# Patient Record
Sex: FEMALE | Race: WHITE | Employment: FULL TIME | URBAN - METROPOLITAN AREA
[De-identification: names, ages, dates, MRNs, and addresses within clinical notes are randomized per-mention and may not be internally consistent; named-entity substitution may affect disease eponyms.]

---

## 2022-01-14 ENCOUNTER — APPOINTMENT (OUTPATIENT)
Dept: CT IMAGING | Age: 57
End: 2022-01-14
Attending: EMERGENCY MEDICINE
Payer: COMMERCIAL

## 2022-01-14 ENCOUNTER — HOSPITAL ENCOUNTER (EMERGENCY)
Age: 57
Discharge: HOME | End: 2022-01-14
Attending: EMERGENCY MEDICINE
Payer: COMMERCIAL

## 2022-01-14 VITALS
HEIGHT: 68 IN | HEART RATE: 104 BPM | RESPIRATION RATE: 18 BRPM | OXYGEN SATURATION: 100 % | TEMPERATURE: 99.1 F | DIASTOLIC BLOOD PRESSURE: 101 MMHG | SYSTOLIC BLOOD PRESSURE: 157 MMHG | BODY MASS INDEX: 34.41 KG/M2 | WEIGHT: 227.07 LBS

## 2022-01-14 DIAGNOSIS — N12 PYELONEPHRITIS: Primary | ICD-10-CM

## 2022-01-14 LAB
APPEARANCE UR: CLEAR
BACTERIA URNS QL MICRO: NEGATIVE /HPF
BILIRUB UR QL: NEGATIVE
CAOX CRY URNS QL MICRO: ABNORMAL
COLOR UR: ABNORMAL
EPITH CASTS URNS QL MICRO: ABNORMAL /LPF
GLUCOSE UR STRIP.AUTO-MCNC: NEGATIVE MG/DL
HGB UR QL STRIP: NEGATIVE
KETONES UR QL STRIP.AUTO: NEGATIVE MG/DL
LEUKOCYTE ESTERASE UR QL STRIP.AUTO: ABNORMAL
NITRITE UR QL STRIP.AUTO: NEGATIVE
PH UR STRIP: 5.5 [PH] (ref 5–8)
PROT UR STRIP-MCNC: NEGATIVE MG/DL
RBC #/AREA URNS HPF: ABNORMAL /HPF (ref 0–5)
SP GR UR REFRACTOMETRY: 1.02 (ref 1–1.03)
UA: UC IF INDICATED,UAUC: ABNORMAL
UROBILINOGEN UR QL STRIP.AUTO: 0.2 EU/DL (ref 0.2–1)
WBC URNS QL MICRO: ABNORMAL /HPF (ref 0–4)

## 2022-01-14 PROCEDURE — 74011250636 HC RX REV CODE- 250/636: Performed by: EMERGENCY MEDICINE

## 2022-01-14 PROCEDURE — 81001 URINALYSIS AUTO W/SCOPE: CPT

## 2022-01-14 PROCEDURE — 74176 CT ABD & PELVIS W/O CONTRAST: CPT

## 2022-01-14 PROCEDURE — 99282 EMERGENCY DEPT VISIT SF MDM: CPT

## 2022-01-14 PROCEDURE — 96372 THER/PROPH/DIAG INJ SC/IM: CPT

## 2022-01-14 PROCEDURE — U0005 INFEC AGEN DETEC AMPLI PROBE: HCPCS

## 2022-01-14 RX ORDER — NAPROXEN 500 MG/1
500 TABLET ORAL 2 TIMES DAILY WITH MEALS
Qty: 20 TABLET | Refills: 0 | Status: SHIPPED | OUTPATIENT
Start: 2022-01-14 | End: 2022-01-24

## 2022-01-14 RX ORDER — KETOROLAC TROMETHAMINE 30 MG/ML
30 INJECTION, SOLUTION INTRAMUSCULAR; INTRAVENOUS
Status: COMPLETED | OUTPATIENT
Start: 2022-01-14 | End: 2022-01-14

## 2022-01-14 RX ORDER — NAPROXEN 500 MG/1
500 TABLET ORAL 2 TIMES DAILY WITH MEALS
Qty: 20 TABLET | Refills: 0 | Status: SHIPPED | OUTPATIENT
Start: 2022-01-14 | End: 2022-01-14 | Stop reason: SDUPTHER

## 2022-01-14 RX ORDER — CEPHALEXIN 500 MG/1
500 CAPSULE ORAL 4 TIMES DAILY
Qty: 28 CAPSULE | Refills: 0 | Status: SHIPPED | OUTPATIENT
Start: 2022-01-14 | End: 2022-01-21

## 2022-01-14 RX ORDER — CEPHALEXIN 500 MG/1
500 CAPSULE ORAL 4 TIMES DAILY
Qty: 28 CAPSULE | Refills: 0 | Status: SHIPPED | OUTPATIENT
Start: 2022-01-14 | End: 2022-01-14 | Stop reason: SDUPTHER

## 2022-01-14 RX ADMIN — KETOROLAC TROMETHAMINE 30 MG: 30 INJECTION, SOLUTION INTRAMUSCULAR; INTRAVENOUS at 16:07

## 2022-01-14 NOTE — ED PROVIDER NOTES
EMERGENCY DEPARTMENT HISTORY AND PHYSICAL EXAM      Date: 1/14/2022  Patient Name: Ryann Bey  Patient Age and Sex: 55 y.o. female     History of Presenting Illness     Chief Complaint   Patient presents with    Flank Pain     x 1 week with hematuria    Headache     tuesday started with headache and fever. History Provided By: Patient    HPI: Ryann Bey is a 76-year-old female presenting with flank pain and fevers. Patient states that over the past couple days has been having some dysuria and hematuria. Thought she might of passed a stone however now continues to have right-sided flank pain radiating to her groin. Also having headache and fevers. No known exposures and she is unvaccinated but feeling unwell. There are no other complaints, changes, or physical findings at this time. PCP: Gypsy Casarez MD    No current facility-administered medications on file prior to encounter. Current Outpatient Medications on File Prior to Encounter   Medication Sig Dispense Refill    omeprazole (PRILOSEC) 40 mg capsule TAKE ONE CAPSULE BY MOUTH DAILY 30 Cap 0    butalbital-aspirin-caffeine (FIORINAL) -40 mg tablet Take 1 Tab by mouth every six (6) hours as needed for Pain or Headache. 30 Tab 3    esomeprazole (NEXIUM) 40 mg capsule Take 1 Cap by mouth daily. 42 Cap 0    alum-mag hydroxide-simeth (MAALOX) 200-200-20 mg/5 mL Susp Take 30 mL by mouth every four (4) hours as needed. 780 mL 0    levothyroxine (SYNTHROID) 50 mcg tablet Take one every three days. 30 Tab 3    mometasone-formoterol (DULERA) 100-5 mcg/actuation HFAA HFA inhaler Take 2 Puffs by inhalation two (2) times a day. 1 Inhaler 0    chlorpheniramine-HYDROcodone (TUSSIONEX) 8-10 mg/5 mL suspension Take 5 mL by mouth every twelve (12) hours as needed for Cough. 90 mL 0    lisinopril (PRINIVIL, ZESTRIL) 10 mg tablet Take 1 Tab by mouth daily. 90 Tab 3    PARoxetine (PAXIL) 20 mg tablet Take 1 Tab by mouth daily.  27 Tab 6    famotidine (PEPCID) 20 mg tablet Take 1 Tab by mouth two (2) times a day. 20 Tab 0    meclizine 50 mg tablet Take 50 mg by mouth three (3) times daily as needed. 80 Tab 3       Past History     Past Medical History:  Past Medical History:   Diagnosis Date    Asthma     Diabetes ([address]     GERD (gastroesophageal reflux disease)     Hypertension     Other ill-defined conditions     meinere's disease       Past Surgical History:  Past Surgical History:   Procedure Laterality Date    HX TUBAL LIGATION  1993       Family History:  Family History   Problem Relation Age of Onset    Diabetes Mother     Hypertension Mother     Heart Disease Father        Social History:  Social History     Tobacco Use    Smoking status: Never Smoker    Smokeless tobacco: Never Used   Substance Use Topics    Alcohol use: No    Drug use: No       Allergies: Allergies   Allergen Reactions    Erythromycin Base Nausea and Vomiting    Tylenol-Codeine #3 [Acetaminophen-Codeine] Itching     Reports can take Tylenol Just codeine causes itching         Review of Systems   Review of Systems   Constitutional: Positive for fatigue and fever. Negative for chills. Respiratory: Negative for cough and shortness of breath. Cardiovascular: Negative for chest pain. Gastrointestinal: Negative for abdominal pain, constipation, diarrhea, nausea and vomiting. Genitourinary: Positive for dysuria, flank pain and hematuria. Negative for frequency. Neurological: Positive for headaches. Negative for weakness and numbness. All other systems reviewed and are negative. Physical Exam   Physical Exam  Vitals and nursing note reviewed. Constitutional:       Appearance: She is well-developed. HENT:      Head: Normocephalic and atraumatic. Nose: Nose normal.      Mouth/Throat:      Mouth: Mucous membranes are moist.   Eyes:      Extraocular Movements: Extraocular movements intact.       Conjunctiva/sclera: Conjunctivae normal.   Cardiovascular:      Rate and Rhythm: Normal rate and regular rhythm. Pulmonary:      Effort: Pulmonary effort is normal. No respiratory distress. Breath sounds: Normal breath sounds. Abdominal:      General: There is no distension. Palpations: Abdomen is soft. Tenderness: There is no abdominal tenderness. Musculoskeletal:         General: Normal range of motion. Cervical back: Normal range of motion and neck supple. Comments: Right flank tenderness   Skin:     General: Skin is warm and dry. Neurological:      General: No focal deficit present. Mental Status: She is alert and oriented to person, place, and time. Mental status is at baseline. Psychiatric:      Comments: Patient is anxious and tearful          Diagnostic Study Results     Labs -     Recent Results (from the past 12 hour(s))   URINALYSIS W/ REFLEX CULTURE    Collection Time: 01/14/22  3:37 PM    Specimen: Urine   Result Value Ref Range    Color YELLOW/STRAW      Appearance CLEAR CLEAR      Specific gravity 1.023 1.003 - 1.030      pH (UA) 5.5 5.0 - 8.0      Protein Negative NEG mg/dL    Glucose Negative NEG mg/dL    Ketone Negative NEG mg/dL    Bilirubin Negative NEG      Blood Negative NEG      Urobilinogen 0.2 0.2 - 1.0 EU/dL    Nitrites Negative NEG      Leukocyte Esterase MODERATE (A) NEG      WBC 5-10 0 - 4 /hpf    RBC 0-5 0 - 5 /hpf    Epithelial cells FEW FEW /lpf    Bacteria Negative NEG /hpf    UA:UC IF INDICATED CULTURE NOT INDICATED BY UA RESULT CNI      CA Oxalate crystals FEW (A) NEG         Radiologic Studies -   CT ABD PELV WO CONT   Final Result   No renal, ureteral, or bladder calculus. CT Results  (Last 48 hours)               01/14/22 1556  CT ABD PELV WO CONT Final result    Impression:  No renal, ureteral, or bladder calculus. Narrative:  EXAM: CT ABD PELV WO CONT       INDICATION: right flank pain with dysuria       COMPARISON: None       CONTRAST:  None. TECHNIQUE:    Thin axial images were obtained through the abdomen and pelvis. Coronal and   sagittal reformats were generated. Oral contrast was not administered. CT dose   reduction was achieved through use of a standardized protocol tailored for this   examination and automatic exposure control for dose modulation. The absence of intravenous contrast material reduces the sensitivity for   evaluation of the vasculature and solid organs. FINDINGS:    LOWER THORAX: No significant abnormality in the incidentally imaged lower chest.   LIVER: No mass. BILIARY TREE: Prior cholecystectomy. CBD is not dilated. SPLEEN: within normal limits. PANCREAS: No focal abnormality. ADRENALS: Unremarkable. KIDNEYS/URETERS: No calculus or hydronephrosis. STOMACH: Unremarkable. SMALL BOWEL: No dilatation or wall thickening. COLON: No dilatation or wall thickening. Sigmoid diverticulosis. APPENDIX: Normal appendix on axial image 61. PERITONEUM: No ascites or pneumoperitoneum. RETROPERITONEUM: No lymphadenopathy or aortic aneurysm. REPRODUCTIVE ORGANS: Anteverted uterus. URINARY BLADDER: No mass or calculus. BONES: No destructive bone lesion. ABDOMINAL WALL: No mass or hernia. ADDITIONAL COMMENTS: N/A               CXR Results  (Last 48 hours)    None            Medical Decision Making   I am the first provider for this patient. I reviewed the vital signs, available nursing notes, past medical history, past surgical history, family history and social history. Vital Signs-Reviewed the patient's vital signs. Patient Vitals for the past 12 hrs:   Temp Pulse Resp BP SpO2   01/14/22 1528 99.1 °F (37.3 °C) (!) 104 18 (!) 157/101 100 %       Records Reviewed: Nursing Notes and Old Medical Records    Provider Notes (Medical Decision Making):   Patient presenting with right-sided flank pain with hematuria and dysuria as well as low-grade fever.   Differential includes pyelonephritis, infected stone, COVID-19 infection. ED Course:   Initial assessment performed. The patients presenting problems have been discussed, and they are in agreement with the care plan formulated and outlined with them. I have encouraged them to ask questions as they arise throughout their visit. Critical Care Time:   0    Disposition:  Discharge Note:  The patient has been re-evaluated and is ready for discharge. Reviewed available results with patient. Counseled patient on diagnosis and care plan. Patient has expressed understanding, and all questions have been answered. Patient agrees with plan and agrees to follow up as recommended, or to return to the ED if their symptoms worsen. Discharge instructions have been provided and explained to the patient, along with reasons to return to the ED. PLAN:  Discharge Medication List as of 1/14/2022  4:52 PM      START taking these medications    Details   cephALEXin (Keflex) 500 mg capsule Take 1 Capsule by mouth four (4) times daily for 7 days. , Normal, Disp-28 Capsule, R-0      naproxen (Naprosyn) 500 mg tablet Take 1 Tablet by mouth two (2) times daily (with meals) for 10 days. , Normal, Disp-20 Tablet, R-0         CONTINUE these medications which have NOT CHANGED    Details   omeprazole (PRILOSEC) 40 mg capsule TAKE ONE CAPSULE BY MOUTH DAILY, Normal, Disp-30 Cap, R-0      butalbital-aspirin-caffeine (FIORINAL) -40 mg tablet Take 1 Tab by mouth every six (6) hours as needed for Pain or Headache., Print, Disp-30 Tab, R-3      esomeprazole (NEXIUM) 40 mg capsule Take 1 Cap by mouth daily. , Print, Disp-42 Cap, R-0      alum-mag hydroxide-simeth (MAALOX) 200-200-20 mg/5 mL Susp Take 30 mL by mouth every four (4) hours as needed. , Print, Disp-780 mL, R-0      levothyroxine (SYNTHROID) 50 mcg tablet Take one every three days. , Sample, Disp-30 Tab, R-3      mometasone-formoterol (DULERA) 100-5 mcg/actuation HFAA HFA inhaler Take 2 Puffs by inhalation two (2) times a day., Sample, Disp-1 Inhaler, R-0      chlorpheniramine-HYDROcodone (TUSSIONEX) 8-10 mg/5 mL suspension Take 5 mL by mouth every twelve (12) hours as needed for Cough. , Print, Disp-90 mL, R-0      lisinopril (PRINIVIL, ZESTRIL) 10 mg tablet Take 1 Tab by mouth daily. Print, 10 mg, Disp-90 Tab, R-3      PARoxetine (PAXIL) 20 mg tablet Take 1 Tab by mouth daily. Print, 20 mg, Disp-30 Tab, R-6      famotidine (PEPCID) 20 mg tablet Take 1 Tab by mouth two (2) times a day. Print, 20 mg, Disp-20 Tab, R-0      meclizine 50 mg tablet Take 50 mg by mouth three (3) times daily as needed. Normal, 50 mg, Disp-90 Tab, R-3           2. Follow-up Information     Follow up With Specialties Details Why Contact Info    Liz Cabrales MD Family Medicine Schedule an appointment as soon as possible for a visit   [address] [place] [address] [phone]          3. Return to ED if worse     Diagnosis     Clinical Impression:   1. Pyelonephritis        Attestations:    Divya Saldivar M.D. Please note that this dictation was completed with [employer], the computer voice recognition software. Quite often unanticipated grammatical, syntax, homophones, and other interpretive errors are inadvertently transcribed by the computer software. Please disregard these errors. Please excuse any errors that have escaped final proofreading. Thank you.

## 2022-01-16 LAB
SARS-COV-2, XPLCVT: DETECTED
SOURCE, COVRS: ABNORMAL

## 2022-09-20 ENCOUNTER — APPOINTMENT (OUTPATIENT)
Dept: GENERAL RADIOLOGY | Age: 57
End: 2022-09-20
Attending: EMERGENCY MEDICINE
Payer: COMMERCIAL

## 2022-09-20 ENCOUNTER — HOSPITAL ENCOUNTER (EMERGENCY)
Age: 57
Discharge: HOME | End: 2022-09-20
Attending: EMERGENCY MEDICINE
Payer: COMMERCIAL

## 2022-09-20 VITALS
SYSTOLIC BLOOD PRESSURE: 132 MMHG | DIASTOLIC BLOOD PRESSURE: 85 MMHG | HEIGHT: 68 IN | BODY MASS INDEX: 34.71 KG/M2 | OXYGEN SATURATION: 95 % | HEART RATE: 60 BPM | WEIGHT: 229 LBS | TEMPERATURE: 97.9 F | RESPIRATION RATE: 13 BRPM

## 2022-09-20 DIAGNOSIS — R11.2 NAUSEA AND VOMITING, UNSPECIFIED VOMITING TYPE: ICD-10-CM

## 2022-09-20 DIAGNOSIS — R51.9 NONINTRACTABLE HEADACHE, UNSPECIFIED CHRONICITY PATTERN, UNSPECIFIED HEADACHE TYPE: Primary | ICD-10-CM

## 2022-09-20 DIAGNOSIS — J06.9 UPPER RESPIRATORY TRACT INFECTION, UNSPECIFIED TYPE: ICD-10-CM

## 2022-09-20 LAB
ALBUMIN SERPL-MCNC: 3.6 G/DL (ref 3.5–5)
ALBUMIN/GLOB SERPL: 0.8 {RATIO} (ref 1.1–2.2)
ALP SERPL-CCNC: 107 U/L (ref 45–117)
ALT SERPL-CCNC: 23 U/L (ref 12–78)
ANION GAP SERPL CALC-SCNC: 11 MMOL/L (ref 5–15)
AST SERPL-CCNC: 13 U/L (ref 15–37)
BASOPHILS # BLD: 0 K/UL (ref 0–0.1)
BASOPHILS NFR BLD: 0 % (ref 0–1)
BILIRUB SERPL-MCNC: 0.3 MG/DL (ref 0.2–1)
BUN SERPL-MCNC: 12 MG/DL (ref 6–20)
BUN/CREAT SERPL: 13 (ref 12–20)
CALCIUM SERPL-MCNC: 8.9 MG/DL (ref 8.5–10.1)
CHLORIDE SERPL-SCNC: 104 MMOL/L (ref 97–108)
CO2 SERPL-SCNC: 25 MMOL/L (ref 21–32)
CREAT SERPL-MCNC: 0.92 MG/DL (ref 0.55–1.02)
DIFFERENTIAL METHOD BLD: NORMAL
EOSINOPHIL # BLD: 0.2 K/UL (ref 0–0.4)
EOSINOPHIL NFR BLD: 4 % (ref 0–7)
ERYTHROCYTE [DISTWIDTH] IN BLOOD BY AUTOMATED COUNT: 13.1 % (ref 11.5–14.5)
GLOBULIN SER CALC-MCNC: 4.4 G/DL (ref 2–4)
GLUCOSE SERPL-MCNC: 89 MG/DL (ref 65–100)
HCT VFR BLD AUTO: 41.6 % (ref 35–47)
HGB BLD-MCNC: 13.7 G/DL (ref 11.5–16)
IMM GRANULOCYTES # BLD AUTO: 0 K/UL (ref 0–0.04)
IMM GRANULOCYTES NFR BLD AUTO: 0 % (ref 0–0.5)
LIPASE SERPL-CCNC: 55 U/L (ref 73–393)
LYMPHOCYTES # BLD: 1.8 K/UL (ref 0.8–3.5)
LYMPHOCYTES NFR BLD: 32 % (ref 12–49)
MCH RBC QN AUTO: 30.1 PG (ref 26–34)
MCHC RBC AUTO-ENTMCNC: 32.9 G/DL (ref 30–36.5)
MCV RBC AUTO: 91.4 FL (ref 80–99)
MONOCYTES # BLD: 0.5 K/UL (ref 0–1)
MONOCYTES NFR BLD: 9 % (ref 5–13)
NEUTS SEG # BLD: 3.2 K/UL (ref 1.8–8)
NEUTS SEG NFR BLD: 55 % (ref 32–75)
NRBC # BLD: 0 K/UL (ref 0–0.01)
NRBC BLD-RTO: 0 PER 100 WBC
PLATELET # BLD AUTO: 290 K/UL (ref 150–400)
PMV BLD AUTO: 10.5 FL (ref 8.9–12.9)
POTASSIUM SERPL-SCNC: 3.6 MMOL/L (ref 3.5–5.1)
PROT SERPL-MCNC: 8 G/DL (ref 6.4–8.2)
RBC # BLD AUTO: 4.55 M/UL (ref 3.8–5.2)
SODIUM SERPL-SCNC: 140 MMOL/L (ref 136–145)
WBC # BLD AUTO: 5.8 K/UL (ref 3.6–11)

## 2022-09-20 PROCEDURE — 71045 X-RAY EXAM CHEST 1 VIEW: CPT

## 2022-09-20 PROCEDURE — 74011250636 HC RX REV CODE- 250/636: Performed by: EMERGENCY MEDICINE

## 2022-09-20 PROCEDURE — 80053 COMPREHEN METABOLIC PANEL: CPT

## 2022-09-20 PROCEDURE — 99284 EMERGENCY DEPT VISIT MOD MDM: CPT

## 2022-09-20 PROCEDURE — 83690 ASSAY OF LIPASE: CPT

## 2022-09-20 PROCEDURE — 85025 COMPLETE CBC W/AUTO DIFF WBC: CPT

## 2022-09-20 PROCEDURE — 74011000250 HC RX REV CODE- 250: Performed by: EMERGENCY MEDICINE

## 2022-09-20 PROCEDURE — U0005 INFEC AGEN DETEC AMPLI PROBE: HCPCS

## 2022-09-20 PROCEDURE — 74011250637 HC RX REV CODE- 250/637: Performed by: EMERGENCY MEDICINE

## 2022-09-20 PROCEDURE — 36415 COLL VENOUS BLD VENIPUNCTURE: CPT

## 2022-09-20 PROCEDURE — 96374 THER/PROPH/DIAG INJ IV PUSH: CPT

## 2022-09-20 PROCEDURE — 96375 TX/PRO/DX INJ NEW DRUG ADDON: CPT

## 2022-09-20 RX ORDER — GABAPENTIN 400 MG/1
400 CAPSULE ORAL DAILY
COMMUNITY

## 2022-09-20 RX ORDER — PROCHLORPERAZINE EDISYLATE 5 MG/ML
10 INJECTION INTRAMUSCULAR; INTRAVENOUS ONCE
Status: COMPLETED | OUTPATIENT
Start: 2022-09-20 | End: 2022-09-20

## 2022-09-20 RX ORDER — ONDANSETRON 4 MG/1
4 TABLET, ORALLY DISINTEGRATING ORAL
Qty: 15 TABLET | Refills: 0 | Status: SHIPPED | OUTPATIENT
Start: 2022-09-20

## 2022-09-20 RX ORDER — DIPHENHYDRAMINE HYDROCHLORIDE 50 MG/ML
25 INJECTION, SOLUTION INTRAMUSCULAR; INTRAVENOUS
Status: COMPLETED | OUTPATIENT
Start: 2022-09-20 | End: 2022-09-20

## 2022-09-20 RX ADMIN — DIPHENHYDRAMINE HYDROCHLORIDE 25 MG: 50 INJECTION INTRAMUSCULAR; INTRAVENOUS at 21:02

## 2022-09-20 RX ADMIN — PROCHLORPERAZINE EDISYLATE 10 MG: 5 INJECTION INTRAMUSCULAR; INTRAVENOUS at 21:03

## 2022-09-20 RX ADMIN — LIDOCAINE HYDROCHLORIDE 40 ML: 20 SOLUTION TOPICAL at 21:03

## 2022-09-20 NOTE — LETTER
[address] EMERGENCY DEPT  [address] [facility] [address] [phone]    Work/School Note    Date: 9/20/2022    To Whom It May concern:    Arelis Maki was seen and treated today in the emergency room by the following provider(s):  No providers found. Arelis Maki may return to work on 9/22/22.     Sincerely,          Yomi Rolon RN

## 2022-09-20 NOTE — ED TRIAGE NOTES
Pt reports acute onset of vomiting x yesterday. Pt states she woke up today with a severe HA and mild blurred vision. Pt endorses severe, mid-upper abdominal pain where her gall bladder used to be. At home COVID test was negative.

## 2022-09-21 LAB
SARS-COV-2, XPLCVT: NOT DETECTED
SOURCE, COVRS: NORMAL

## 2022-09-21 NOTE — ED NOTES
See triage note. Pt appears to be in NAD. No active vomiting witnessed. Emergency Department Nursing Plan of Care       The Nursing Plan of Care is developed from the Nursing assessment and Emergency Department Attending provider initial evaluation. The plan of care may be reviewed in the ED Provider note.     The Plan of Care was developed with the following considerations:   Patient / Family readiness to learn indicated by:verbalized understanding  Persons(s) to be included in education: patient  Barriers to Learning/Limitations:No    Signed     Pam Cleary RN    9/20/2022   8:36 PM

## 2022-09-21 NOTE — ED NOTES
Discharge instructions were given to the patient by Yassine Tavares RN. The patient left the Emergency Department ambulatory, alert and oriented and in no acute distress with one prescriptions. The patient was encouraged to call or return to the ED for worsening issues or problems and was encouraged to schedule a follow up appointment for continuing care. The patient verbalized understanding of discharge instructions and prescriptions, all questions were answered. The patient has no further concerns at this time.

## 2022-10-02 NOTE — ED PROVIDER NOTES
EMERGENCY DEPARTMENT HISTORY AND PHYSICAL EXAM      Date: 9/20/2022  Patient Name: Michoacano Oneil    History of Presenting Illness     Chief Complaint   Patient presents with    Vomiting    Headache       History Provided By: Patient    HPI: Michoacano Oneil, 65 y.o. female with PMHx as noted below presents the emergency department chief complaint of headache, nausea vomiting and abdominal pain. Patient reported onset of symptoms Monday. She reports an epigastric cramping that has been constant, moderate in intensity. Also reports having a cough as well as headache, nausea vomiting. Patient reports headache was gradual in onset, moderate to severe in intensity and diffuse throughout her entire head. Patient denied any blurred vision to me but states that she feels lightheaded with tunnel vision as though she is going to pass out. She is having no blurred or double vision at this time. She denies any neck stiffness and states she has had similar headaches in the past and has been told she has migraines. Pt denies any other alleviating or exacerbating factors. Additionally, pt specifically denies any recent fever, chills, CP, SOB, numbness, weakness, BLE swelling, heart palpitations, urinary sxs, diarrhea, constipation, melena, hematochezia,     PCP: Astrid Westfall MD    Current Outpatient Medications   Medication Sig Dispense Refill    gabapentin (NEURONTIN) 400 mg capsule Take 400 mg by mouth daily. linaCLOtide (LINZESS) 145 mcg cap capsule Take 145 mcg by mouth daily. ondansetron (ZOFRAN ODT) 4 mg disintegrating tablet Take 1 Tablet by mouth every eight (8) hours as needed for Nausea or Vomiting. 15 Tablet 0    butalbital-aspirin-caffeine (FIORINAL) -40 mg tablet Take 1 Tab by mouth every six (6) hours as needed for Pain or Headache. 30 Tab 3    lisinopril (PRINIVIL, ZESTRIL) 10 mg tablet Take 1 Tab by mouth daily.  90 Tab 3    omeprazole (PRILOSEC) 40 mg capsule TAKE ONE CAPSULE BY MOUTH DAILY (Patient not taking: Reported on 9/20/2022) 30 Cap 0    esomeprazole (NEXIUM) 40 mg capsule Take 1 Cap by mouth daily. (Patient not taking: Reported on 9/20/2022) 42 Cap 0    alum-mag hydroxide-simeth (MAALOX) 200-200-20 mg/5 mL Susp Take 30 mL by mouth every four (4) hours as needed. (Patient not taking: Reported on 9/20/2022) 780 mL 0    levothyroxine (SYNTHROID) 50 mcg tablet Take one every three days. (Patient not taking: Reported on 9/20/2022) 30 Tab 3    mometasone-formoterol (DULERA) 100-5 mcg/actuation HFAA HFA inhaler Take 2 Puffs by inhalation two (2) times a day. (Patient not taking: Reported on 9/20/2022) 1 Inhaler 0    chlorpheniramine-HYDROcodone (TUSSIONEX) 8-10 mg/5 mL suspension Take 5 mL by mouth every twelve (12) hours as needed for Cough. (Patient not taking: Reported on 9/20/2022) 90 mL 0    PARoxetine (PAXIL) 20 mg tablet Take 1 Tab by mouth daily. (Patient not taking: Reported on 9/20/2022) 30 Tab 6    famotidine (PEPCID) 20 mg tablet Take 1 Tab by mouth two (2) times a day. (Patient not taking: Reported on 9/20/2022) 20 Tab 0    meclizine 50 mg tablet Take 50 mg by mouth three (3) times daily as needed. (Patient not taking: Reported on 9/20/2022) 90 Tab 3       Past History     Past Medical History:  Past Medical History:   Diagnosis Date    Asthma     Diabetes ([facility] [address]     GERD (gastroesophageal reflux disease)     Hypertension     Other ill-defined conditions(799.89)     meinere's disease       Past Surgical History:  Past Surgical History:   Procedure Laterality Date    HX TUBAL LIGATION  1993       Family History:  Family History   Problem Relation Age of Onset    Diabetes Mother     Hypertension Mother     Heart Disease Father        Social History:  Social History     Tobacco Use    Smoking status: Never     Passive exposure: Never    Smokeless tobacco: Never   Substance Use Topics    Alcohol use: No    Drug use: No       Allergies:   Allergies   Allergen Reactions Erythromycin Base Nausea and Vomiting    Tylenol-Codeine #3 [Acetaminophen-Codeine] Itching     Reports can take Tylenol Just codeine causes itching         Review of Systems   Review of Systems  Constitutional: Negative for fever, chills, and fatigue. HENT: Negative for congestion, sore throat, rhinorrhea, sneezing and neck stiffness   Eyes: Negative for discharge and redness. Respiratory: Positive cough. Negative for  shortness of breath, wheezing   Cardiovascular: Negative for chest pain, palpitations   Gastrointestinal: Positive for nausea, vomiting, abdominal pain. negative constipation, diarrhea and blood in stool. Genitourinary: Negative for dysuria, hematuria, flank pain, decreased urine volume, discharge,   Musculoskeletal: Negative for myalgias or joint pain . Skin: Negative for rash or lesions . Neurological: Negative weakness, numbness. Positive headaches. Physical Exam   Physical Exam    GENERAL: alert and oriented, no acute distress  EYES: PEERL, No injection, discharge or icterus. ENT: Mucous membranes pink and moist.  NECK: Supple  LUNGS: Airway patent. Non-labored respirations. Breath sounds clear with good air entry bilaterally. HEART: Regular rate and rhythm. No peripheral edema  ABDOMEN: Non-distended and non-tender, without guarding or rebound. SKIN:  warm, dry  MSK/EXTREMITIES: Without swelling, tenderness or deformity, symmetric with normal ROM  NEUROLOGICAL:  alert and oriented x 3, CN II-XII grossly intact, strength 5/5 bilateral upper and lower extremities, sensation intact throughout to light touch, no dysmetria or ataxia noted        Diagnostic Study Results     Labs -   No results found for this or any previous visit (from the past 12 hour(s)).     Radiologic Studies -   XR CHEST PORT   Final Result        CT Results  (Last 48 hours)      None          CXR Results  (Last 48 hours)      None              Medical Decision Making     Gio MAST MD am the first provider for this patient and am the attending of record for this patient encounter. I reviewed the vital signs, available nursing notes, past medical history, past surgical history, family history and social history. Vital Signs-Reviewed the patient's vital signs. Records Reviewed: Nursing Notes and Old Medical Records    Provider Notes (Medical Decision Making): On presentation, the patient is well appearing, in no acute distress with normal vital signs. Based on my history and exam the differential diagnosis for this patient includes COVID, URI, bronchitis, pneumonia, increased intracranial pressure/pseudotumor, subarachnoid hemorrhage, meningitis, dural venous thrombosis, intracranial hemorrhage, gastritis, pancreatitis. Patient abdomen is soft, benign and nontender. Labs are overall reassuring. Patient given GI cocktail as well as migraine cocktail with improvement in symptoms. Patient tolerating p.o. Based on patient's description of symptoms, no red flags that would warrant head imaging at this time or lumbar puncture. ED Course:   Initial assessment performed. The patients presenting problems have been discussed, and they are in agreement with the care plan formulated and outlined with them. I have encouraged them to ask questions as they arise throughout their visit. Medications   mylanta/viscous lidocaine (GI COCKTAIL) (40 mL Oral Given 9/20/22 2103)   prochlorperazine (COMPAZINE) injection 10 mg (10 mg IntraVENous Given 9/20/22 2103)   diphenhydrAMINE (BENADRYL) injection 25 mg (25 mg IntraVENous Given 9/20/22 2102)         Chacorta Lorraine JACQUES Guerra's  results have been reviewed with her. She has been counseled regarding her diagnosis. She verbally conveys understanding and agreement of the signs, symptoms, diagnosis, treatment and prognosis and additionally agrees to follow up as recommended with Dr. Zeke Baugh MD in 24 - 48 hours.   She also agrees with the care-plan and conveys that all of her questions have been answered. I have also put together some discharge instructions for her that include: 1) educational information regarding their diagnosis, 2) how to care for their diagnosis at home, as well a 3) list of reasons why they would want to return to the ED prior to their follow-up appointment, should their condition change. Disposition:  home    PLAN:  1. Discharge Medication List as of 9/20/2022 10:53 PM        2. Follow-up Information       Follow up With Specialties Details Why Contact Info    Jose Angel Brown MD Family Medicine Schedule an appointment as soon as possible for a visit in 2 days  [address] [facility] [phone]      [facility] EMERGENCY DEPT Emergency Medicine  If symptoms worsen [facility]  [phone]    Please call tomorrow to follow-up with your ophthalmologist  In 3 days      Gato Belle MD Neurology Schedule an appointment as soon as possible for a visit in 2 days  [address]  [address]  [phone]            Return to ED if worse     Diagnosis     Clinical Impression:   1. Nonintractable headache, unspecified chronicity pattern, unspecified headache type    2. Nausea and vomiting, unspecified vomiting type    3. Upper respiratory tract infection, unspecified type        Please note that this dictation was completed with Dragon, computer voice recognition software. Quite often unanticipated grammatical, syntax, homophones, and other interpretive errors are inadvertently transcribed by the computer software. Please disregard these errors. Additionally, please excuse any errors that have escaped final proofreading.

## 2022-11-15 ENCOUNTER — HOSPITAL ENCOUNTER (EMERGENCY)
Age: 57
Discharge: HOME | End: 2022-11-15
Attending: EMERGENCY MEDICINE
Payer: COMMERCIAL

## 2022-11-15 ENCOUNTER — APPOINTMENT (OUTPATIENT)
Dept: GENERAL RADIOLOGY | Age: 57
End: 2022-11-15
Attending: EMERGENCY MEDICINE
Payer: COMMERCIAL

## 2022-11-15 VITALS
SYSTOLIC BLOOD PRESSURE: 132 MMHG | TEMPERATURE: 98.4 F | HEIGHT: 68 IN | OXYGEN SATURATION: 97 % | DIASTOLIC BLOOD PRESSURE: 102 MMHG | HEART RATE: 84 BPM | WEIGHT: 223.11 LBS | BODY MASS INDEX: 33.81 KG/M2 | RESPIRATION RATE: 20 BRPM

## 2022-11-15 DIAGNOSIS — J06.9 VIRAL URI WITH COUGH: Primary | ICD-10-CM

## 2022-11-15 LAB
APPEARANCE UR: CLEAR
BACTERIA URNS QL MICRO: NEGATIVE /HPF
BILIRUB UR QL CFM: NEGATIVE
COLOR UR: ABNORMAL
EPITH CASTS URNS QL MICRO: ABNORMAL /LPF
FLUAV AG NPH QL IA: NEGATIVE
FLUBV AG NOSE QL IA: NEGATIVE
GLUCOSE UR STRIP.AUTO-MCNC: NEGATIVE MG/DL
HGB UR QL STRIP: NEGATIVE
KETONES UR QL STRIP.AUTO: ABNORMAL MG/DL
LEUKOCYTE ESTERASE UR QL STRIP.AUTO: NEGATIVE
NITRITE UR QL STRIP.AUTO: NEGATIVE
PH UR STRIP: 5.5 [PH] (ref 5–8)
PROT UR STRIP-MCNC: ABNORMAL MG/DL
RBC #/AREA URNS HPF: ABNORMAL /HPF (ref 0–5)
SP GR UR REFRACTOMETRY: 1.02
UA: UC IF INDICATED,UAUC: ABNORMAL
UROBILINOGEN UR QL STRIP.AUTO: 1 EU/DL (ref 0.2–1)
WBC URNS QL MICRO: ABNORMAL /HPF (ref 0–4)

## 2022-11-15 PROCEDURE — 87804 INFLUENZA ASSAY W/OPTIC: CPT

## 2022-11-15 PROCEDURE — 99284 EMERGENCY DEPT VISIT MOD MDM: CPT

## 2022-11-15 PROCEDURE — 71046 X-RAY EXAM CHEST 2 VIEWS: CPT

## 2022-11-15 PROCEDURE — 81001 URINALYSIS AUTO W/SCOPE: CPT

## 2022-11-15 RX ORDER — ALBUTEROL SULFATE 90 UG/1
2 AEROSOL, METERED RESPIRATORY (INHALATION)
Qty: 1 EACH | Refills: 0 | Status: SHIPPED | OUTPATIENT
Start: 2022-11-15

## 2022-11-15 RX ORDER — PREDNISONE 10 MG/1
TABLET ORAL
Qty: 1 DOSE PACK | Refills: 0 | Status: SHIPPED | OUTPATIENT
Start: 2022-11-15

## 2022-11-15 NOTE — ED NOTES
Pt presents ambulatory to ED complaining of congested cough, headache, fatigue, and stomach ache for the past 2 days. 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 Pt is also asking to be tested for a kidney infection. Pt denies urinary symptoms at this time. Pt is alert and oriented x 4, RR even and unlabored, skin is warm and dry. Assesment completed and pt updated on plan of care. Emergency Department Nursing Plan of Care       The Nursing Plan of Care is developed from the Nursing assessment and Emergency Department Attending provider initial evaluation. The plan of care may be reviewed in the ED Provider note.     The Plan of Care was developed with the following considerations:   Patient / Family readiness to learn indicated by:verbalized understanding  Persons(s) to be included in education: patient  Barriers to Learning/Limitations:No    Signed     Caterina Nesbitt RN    11/15/2022   7:25 AM

## 2022-11-15 NOTE — ED NOTES
Discharge instructions were given to the patient by Shayne Cook RN. The patient left the Emergency Department ambulatory, alert and oriented and in no acute distress with 2 prescriptions. The patient was encouraged to call or return to the ED for worsening issues or problems and was encouraged to schedule a follow up appointment for continuing care. The patient verbalized understanding of discharge instructions and prescriptions, all questions were answered. The patient has no further concerns at this time.

## 2022-11-15 NOTE — Clinical Note
[facility] EMERGENCY DEPT  [address] [address] [phone]    Work/School Note    Date: 11/15/2022    To Whom It May concern:    Angel Chilel was seen and treated today in the emergency room by the following provider(s):  Attending Provider: David Can MD.      Angel Chilel is excused from work/school on 11/15/22 and 11/16/22. She is medically clear to return to work/school on 11/17/2022.        Sincerely,          Alicia Arzola MD

## 2022-11-15 NOTE — ED PROVIDER NOTES
EMERGENCY DEPARTMENT HISTORY AND PHYSICAL EXAM      Date: 11/15/2022  Patient Name: Brown Street    History of Presenting Illness     Chief Complaint   Patient presents with    Flu Like Symptoms     History Provided By: Patient    HPI: Brown Street, 65 y.o. female with past medical history significant for asthma, diabetes, GERD, and hypertension who presents via private vehicle to the ED with cc of cough, congestion, myalgias, headache, and fatigue for the past 2 days. Patient is a  at a private school and states multiple children have been sick with similar symptoms recently. She denies taking any medications for the symptoms. She is also asking to be tested for a kidney infection as she once had the symptoms and was told she had both COVID and pyelonephritis. Her headache is described as a throbbing/aching pain that is constant in nature. She denies any radiation of the symptoms. PMHx: Asthma, diabetes, GERD, and hypertension  Social Hx: Denies alcohol, tobacco, or illegal drug use    PCP: Lynne Guillaume MD    There are no other complaints, changes, or physical findings at this time. No current facility-administered medications on file prior to encounter. Current Outpatient Medications on File Prior to Encounter   Medication Sig Dispense Refill    linaCLOtide (LINZESS) 145 mcg cap capsule Take 145 mcg by mouth daily. lisinopril (PRINIVIL, ZESTRIL) 10 mg tablet Take 1 Tab by mouth daily. 90 Tab 3    gabapentin (NEURONTIN) 400 mg capsule Take 400 mg by mouth daily. ondansetron (ZOFRAN ODT) 4 mg disintegrating tablet Take 1 Tablet by mouth every eight (8) hours as needed for Nausea or Vomiting.  15 Tablet 0    omeprazole (PRILOSEC) 40 mg capsule TAKE ONE CAPSULE BY MOUTH DAILY (Patient not taking: Reported on 9/20/2022) 30 Cap 0    [DISCONTINUED] butalbital-aspirin-caffeine (FIORINAL) -40 mg tablet Take 1 Tab by mouth every six (6) hours as needed for Pain or Headache. 30 Tab 3    esomeprazole (NEXIUM) 40 mg capsule Take 1 Cap by mouth daily. (Patient not taking: Reported on 9/20/2022) 42 Cap 0    alum-mag hydroxide-simeth (MAALOX) 200-200-20 mg/5 mL Susp Take 30 mL by mouth every four (4) hours as needed. (Patient not taking: Reported on 9/20/2022) 780 mL 0    levothyroxine (SYNTHROID) 50 mcg tablet Take one every three days. (Patient not taking: Reported on 9/20/2022) 30 Tab 3    mometasone-formoterol (DULERA) 100-5 mcg/actuation HFAA HFA inhaler Take 2 Puffs by inhalation two (2) times a day. (Patient not taking: Reported on 9/20/2022) 1 Inhaler 0    [DISCONTINUED] chlorpheniramine-HYDROcodone (TUSSIONEX) 8-10 mg/5 mL suspension Take 5 mL by mouth every twelve (12) hours as needed for Cough. (Patient not taking: Reported on 9/20/2022) 90 mL 0    PARoxetine (PAXIL) 20 mg tablet Take 1 Tab by mouth daily. (Patient not taking: Reported on 9/20/2022) 30 Tab 6    famotidine (PEPCID) 20 mg tablet Take 1 Tab by mouth two (2) times a day. (Patient not taking: Reported on 9/20/2022) 20 Tab 0    meclizine 50 mg tablet Take 50 mg by mouth three (3) times daily as needed. (Patient not taking: Reported on 9/20/2022) 90 Tab 3     Past History     Past Medical History:  Past Medical History:   Diagnosis Date    Asthma     Diabetes ([facility] [address]     GERD (gastroesophageal reflux disease)     Hypertension     Other ill-defined conditions(799.89)     meinere's disease     Past Surgical History:  Past Surgical History:   Procedure Laterality Date    HX TUBAL LIGATION  1993     Family History:  Family History   Problem Relation Age of Onset    Diabetes Mother     Hypertension Mother     Heart Disease Father      Social History:  Social History     Tobacco Use    Smoking status: Never     Passive exposure: Never    Smokeless tobacco: Never   Substance Use Topics    Alcohol use: No    Drug use: No     Allergies:   Allergies   Allergen Reactions    Erythromycin Base Nausea and Vomiting Tylenol-Codeine #3 [Acetaminophen-Codeine] Itching     Reports can take Tylenol Just codeine causes itching     Review of Systems   Review of Systems   Constitutional:  Positive for fatigue. Negative for chills and fever. HENT:  Positive for congestion. Negative for rhinorrhea, sneezing and sore throat. Eyes:  Negative for redness and visual disturbance. Respiratory:  Positive for cough. Negative for shortness of breath. Cardiovascular:  Negative for leg swelling. Gastrointestinal:  Negative for abdominal pain, nausea and vomiting. Genitourinary:  Negative for difficulty urinating and frequency. Musculoskeletal:  Negative for back pain, myalgias and neck stiffness. Skin:  Negative for rash. Neurological:  Positive for headaches. Negative for dizziness, syncope and weakness. Hematological:  Negative for adenopathy. All other systems reviewed and are negative. Physical Exam   Physical Exam  Vitals and nursing note reviewed. Constitutional:       Appearance: Normal appearance. She is well-developed. She is obese. She is ill-appearing. HENT:      Head: Normocephalic and atraumatic. Eyes:      Conjunctiva/sclera: Conjunctivae normal.   Cardiovascular:      Rate and Rhythm: Normal rate and regular rhythm. Pulses: Normal pulses. Heart sounds: Normal heart sounds, S1 normal and S2 normal.   Pulmonary:      Effort: Pulmonary effort is normal. No respiratory distress. Breath sounds: Examination of the left-lower field reveals rhonchi. Rhonchi present. No wheezing. Abdominal:      General: Bowel sounds are normal. There is no distension. Palpations: Abdomen is soft. Tenderness: There is no abdominal tenderness. There is no rebound. Musculoskeletal:         General: Normal range of motion. Cervical back: Full passive range of motion without pain, normal range of motion and neck supple. Skin:     General: Skin is warm and dry. Findings: No rash. Neurological:      Mental Status: She is alert and oriented to person, place, and time. Psychiatric:         Speech: Speech normal.         Behavior: Behavior normal.         Thought Content: Thought content normal.         Judgment: Judgment normal.     Diagnostic Study Results   Labs -     Recent Results (from the past 12 hour(s))   INFLUENZA A+B VIRAL AGS    Collection Time: 11/15/22  7:38 AM   Result Value Ref Range    Influenza A Antigen Negative NEG      Influenza B Antigen Negative NEG     URINALYSIS W/ REFLEX CULTURE    Collection Time: 11/15/22  7:38 AM    Specimen: Miscellaneous sample; Urine    Urine specimen   Result Value Ref Range    Color DARK YELLOW      Appearance CLEAR CLEAR      Specific gravity 1.025      pH (UA) 5.5 5.0 - 8.0      Protein TRACE (A) NEG mg/dL    Glucose Negative NEG mg/dL    Ketone TRACE (A) NEG mg/dL    Blood Negative NEG      Urobilinogen 1.0 0.2 - 1.0 EU/dL    Nitrites Negative NEG      Leukocyte Esterase Negative NEG      WBC 0-4 0 - 4 /hpf    RBC 0-5 0 - 5 /hpf    Epithelial cells FEW FEW /lpf    Bacteria Negative NEG /hpf    UA:UC IF INDICATED CULTURE NOT INDICATED BY UA RESULT CNI     BILIRUBIN, CONFIRM    Collection Time: 11/15/22  7:38 AM   Result Value Ref Range    Bilirubin UA, confirm Negative NEG         Radiologic Studies -   XR CHEST PA LAT   Final Result      No acute process or change compared to the prior exam.           XR CHEST PA LAT    Result Date: 11/15/2022  No acute process or change compared to the prior exam.    Medical Decision Making   I am the first provider for this patient. I reviewed the vital signs, available nursing notes, past medical history, past surgical history, family history and social history. Vital Signs-Reviewed the patient's vital signs.   Patient Vitals for the past 24 hrs:   Temp Pulse Resp BP SpO2   11/15/22 0709 98.4 °F (36.9 °C) 84 20 (!) 132/102 97 %     Pulse Oximetry Analysis - 97% on RA (normal)    Records Reviewed: Nursing Notes and Old Medical Records    Provider Notes (Medical Decision Making):   69-year-old female presents with cough, congestion, headache, and fatigue for the past 2 days. Differential includes influenza, COVID-19, viral URI, pneumonia, and low suspicion for pyelonephritis. Will swab for flu, obtain a chest x-ray, send a urinalysis, and reassess. ED Course:   Initial assessment performed. The patients presenting problems have been discussed, and they are in agreement with the care plan formulated and outlined with them. I have encouraged them to ask questions as they arise throughout their visit. Progress Note  9:14 AM  I have re-evaluated pt and her flu swabs are negative. Her chest x-ray is negative as well. Will discharge with a prescription for an albuterol inhaler and prednisone and have her follow-up with outpatient primary care. Progress Note:   Updated pt on all returned results and findings. Discussed the importance of proper follow up as referred below along with return precautions. Pt in agreement with the care plan and expresses agreement with and understanding of all items discussed. Disposition:  Discharge Note:  The pt is ready for discharge. The pt's signs, symptoms, diagnosis, and discharge instructions have been discussed and pt has conveyed their understanding. The pt is to follow up as recommended or return to ER should their symptoms worsen. Plan has been discussed and pt is in agreement. PLAN:  1. Current Discharge Medication List        START taking these medications    Details   albuterol (PROVENTIL HFA, VENTOLIN HFA, PROAIR HFA) 90 mcg/actuation inhaler Take 2 Puffs by inhalation every four (4) hours as needed for Wheezing. Qty: 1 Each, Refills: 0  Start date: 11/15/2022      predniSONE (STERAPRED DS) 10 mg dose pack Take as directed on packaging label for a 6-day taper  Qty: 1 Dose Pack, Refills: 0  Start date: 11/15/2022           2.    Follow-up Information Follow up With Specialties Details Why Contact Info    Yvonne Snyder MD Family Medicine Schedule an appointment as soon as possible for a visit   [address] [phone]      [facility] EMERGENCY DEPT Emergency Medicine  As needed, If symptoms worsen Eloise Carmona          Return to ED if worse     Diagnosis     Clinical Impression:   1. Viral URI with cough            Please note that this dictation was completed with Dragon, computer voice recognition software. Quite often unanticipated grammatical, syntax, homophones, and other interpretive errors are inadvertently transcribed by the computer software. Please disregard these errors. Additionally, please excuse any errors that have escaped final proofreading.

## 2022-11-15 NOTE — Clinical Note
[facility] EMERGENCY DEPT  [address] [address] [phone]    Work/School Note    Date: 11/15/2022    To Whom It May concern:      Angel Chilel was seen and treated today in the emergency room by the following provider(s):  Attending Provider: David Can MD.      Angel Chilel is excused from work/school on 11/15/22. She is clear to return to work/school on 11/16/22.         Sincerely,          Alicia Arzola MD

## 2023-02-22 ENCOUNTER — APPOINTMENT (OUTPATIENT)
Dept: GENERAL RADIOLOGY | Age: 58
End: 2023-02-22
Attending: STUDENT IN AN ORGANIZED HEALTH CARE EDUCATION/TRAINING PROGRAM
Payer: COMMERCIAL

## 2023-02-22 ENCOUNTER — HOSPITAL ENCOUNTER (EMERGENCY)
Age: 58
Discharge: HOME | End: 2023-02-22
Attending: STUDENT IN AN ORGANIZED HEALTH CARE EDUCATION/TRAINING PROGRAM
Payer: COMMERCIAL

## 2023-02-22 VITALS
WEIGHT: 232 LBS | SYSTOLIC BLOOD PRESSURE: 156 MMHG | DIASTOLIC BLOOD PRESSURE: 109 MMHG | RESPIRATION RATE: 20 BRPM | HEIGHT: 68 IN | BODY MASS INDEX: 35.16 KG/M2 | TEMPERATURE: 98.1 F | HEART RATE: 77 BPM | OXYGEN SATURATION: 99 %

## 2023-02-22 DIAGNOSIS — J06.9 UPPER RESPIRATORY TRACT INFECTION, UNSPECIFIED TYPE: Primary | ICD-10-CM

## 2023-02-22 DIAGNOSIS — Z20.822 PERSON UNDER INVESTIGATION FOR COVID-19: ICD-10-CM

## 2023-02-22 LAB
DEPRECATED S PYO AG THROAT QL EIA: NEGATIVE
FLUAV AG NPH QL IA: NEGATIVE
FLUBV AG NOSE QL IA: NEGATIVE
SARS-COV-2 RNA RESP QL NAA+PROBE: NOT DETECTED
SARS-COV-2, COV2: NORMAL
SOURCE, COVRS: NORMAL

## 2023-02-22 PROCEDURE — 87147 CULTURE TYPE IMMUNOLOGIC: CPT

## 2023-02-22 PROCEDURE — 87880 STREP A ASSAY W/OPTIC: CPT

## 2023-02-22 PROCEDURE — 99284 EMERGENCY DEPT VISIT MOD MDM: CPT

## 2023-02-22 PROCEDURE — 87070 CULTURE OTHR SPECIMN AEROBIC: CPT

## 2023-02-22 PROCEDURE — 71045 X-RAY EXAM CHEST 1 VIEW: CPT

## 2023-02-22 PROCEDURE — 87804 INFLUENZA ASSAY W/OPTIC: CPT

## 2023-02-22 PROCEDURE — U0005 INFEC AGEN DETEC AMPLI PROBE: HCPCS

## 2023-02-22 RX ORDER — PREDNISONE 20 MG/1
20 TABLET ORAL DAILY
Qty: 5 TABLET | Refills: 0 | Status: SHIPPED | OUTPATIENT
Start: 2023-02-22 | End: 2023-02-27

## 2023-02-22 RX ORDER — BENZONATATE 100 MG/1
100 CAPSULE ORAL
Qty: 30 CAPSULE | Refills: 0 | Status: SHIPPED | OUTPATIENT
Start: 2023-02-22 | End: 2023-03-01

## 2023-02-22 NOTE — Clinical Note
[address]  [facility] EMERGENCY DEPT  [address] [place] [address]  Mae Aguilera [id number] [phone]    Work/School Note    Date: 2/22/2023    To Whom It May concern:    Jeffrey Davis was seen and treated today in the emergency room by the following provider(s):  Attending Provider: Maribel Doll DO  Physician Assistant: Sukhwinder Michele. Jeffrey Davis is excused from work/school on 2/22/2023 through 2/24/2023. She is medically clear to return to work/school on 2/25/2023.          Sincerely,          Sukhwinder Fry

## 2023-02-22 NOTE — ED PROVIDER NOTES
[facility] EMERGENCY DEPT  EMERGENCY DEPARTMENT ENCOUNTER       Pt Name: Josseline Silverio  MRN: [id number]  [place] 1965  Date of evaluation: 2/22/2023  Provider: CAITIE Singh   PCP: None  Note Started: 10:10 AM 2/22/23     CHIEF COMPLAINT       Chief Complaint   Patient presents with    Cough     Pt reporting nasal congestion, chest congestion and pain w/ cough and dry nose x Monday. Pt notes intermittent fevers (highest 100.6), but denies any n/v/d or body aches. Pt last dose ibuprofen around 0700        HISTORY OF PRESENT ILLNESS: 1 or more elements      History From: Patient  HPI Limitations : None     Josseline Silverio is a 65 y.o. nonsmoking female who presents BIB self with CC of nasal congestion, chest congestion, cough x 3 days. She complains of sore throat, generalized chest, and abdominal pain felt with coughing episodes only. She admits a low-grade fever at onset of symptoms. Denies headache, difficulty swallowing, exertional chest pain, nausea, vomiting, diarrhea, myalgias, rash. She notes that she has gotten several URIs this season. She had COVID in November. Works as a teacher. Nursing Notes were all reviewed and agreed with or any disagreements were addressed in the HPI. REVIEW OF SYSTEMS      Review of Systems   Constitutional:  Positive for fever. HENT:  Positive for congestion and sore throat. Negative for trouble swallowing and voice change. Respiratory:  Positive for cough. Cardiovascular:  Positive for chest pain (with coughing). Gastrointestinal:  Positive for abdominal pain (with coughing). Negative for constipation, diarrhea, nausea and vomiting. Musculoskeletal:  Negative for neck stiffness. Skin:  Negative for rash. Hematological:  Does not bruise/bleed easily. Positives and Pertinent negatives as per HPI.     PAST HISTORY     Past Medical History:  Past Medical History:   Diagnosis Date    Asthma     Diabetes ([address]     GERD (gastroesophageal reflux disease)     Hypertension     Other ill-defined conditions(799.89)     meinere's disease       Past Surgical History:  Past Surgical History:   Procedure Laterality Date    HX TUBAL LIGATION  1993       Family History:  Family History   Problem Relation Age of Onset    Diabetes Mother     Hypertension Mother     Heart Disease Father        Social History:  Social History     Tobacco Use    Smoking status: Never     Passive exposure: Never    Smokeless tobacco: Never   Substance Use Topics    Alcohol use: No    Drug use: No       Allergies: Allergies   Allergen Reactions    Erythromycin Base Nausea and Vomiting    Tylenol-Codeine #3 [Acetaminophen-Codeine] Itching     Reports can take Tylenol Just codeine causes itching       CURRENT MEDICATIONS      Discharge Medication List as of 2/22/2023 10:25 AM        CONTINUE these medications which have NOT CHANGED    Details   albuterol (PROVENTIL HFA, VENTOLIN HFA, PROAIR HFA) 90 mcg/actuation inhaler Take 2 Puffs by inhalation every four (4) hours as needed for Wheezing., Normal, Disp-1 Each, R-0      predniSONE (STERAPRED DS) 10 mg dose pack Take as directed on packaging label for a 6-day taper, Normal, Disp-1 Dose Pack, R-0      gabapentin (NEURONTIN) 400 mg capsule Take 400 mg by mouth daily. , Historical Med      linaCLOtide (LINZESS) 145 mcg cap capsule Take 145 mcg by mouth daily. , Historical Med      ondansetron (ZOFRAN ODT) 4 mg disintegrating tablet Take 1 Tablet by mouth every eight (8) hours as needed for Nausea or Vomiting., Normal, Disp-15 Tablet, R-0      lisinopril (PRINIVIL, ZESTRIL) 10 mg tablet Take 1 Tab by mouth daily. Print, 10 mg, Disp-90 Tab, R-3             PHYSICAL EXAM      ED Triage Vitals [02/22/23 0846]   ED Encounter Vitals Group      BP (!) 156/109      Pulse (Heart Rate) 77      Resp Rate 20      Temp 98.1 °F (36.7 °C)      Temp src       O2 Sat (%) 99 %      Weight 232 lb      Height 5' 8\"        Physical Exam  Vitals and nursing note reviewed. Constitutional:       General: She is not in acute distress. Appearance: Normal appearance. She is not toxic-appearing. HENT:      Head: Normocephalic and atraumatic. Right Ear: Tympanic membrane normal.      Left Ear: Tympanic membrane normal.      Nose: Congestion present. No rhinorrhea. Mouth/Throat:      Mouth: Mucous membranes are moist.      Pharynx: No oropharyngeal exudate or posterior oropharyngeal erythema. Eyes:      Extraocular Movements: Extraocular movements intact. Conjunctiva/sclera: Conjunctivae normal.      Pupils: Pupils are equal, round, and reactive to light. Cardiovascular:      Rate and Rhythm: Normal rate and regular rhythm. Pulmonary:      Effort: Pulmonary effort is normal.      Breath sounds: Normal breath sounds. No wheezing, rhonchi or rales. Abdominal:      Palpations: Abdomen is soft. Tenderness: There is no abdominal tenderness. Musculoskeletal:         General: Normal range of motion. Cervical back: Normal range of motion and neck supple. No rigidity. Skin:     General: Skin is warm and dry. Neurological:      General: No focal deficit present. Mental Status: She is alert and oriented to person, place, and time. Psychiatric:         Mood and Affect: Mood normal.         Behavior: Behavior normal.        DIAGNOSTIC RESULTS   LABS:     Recent Results (from the past 12 hour(s))   SARS-COV-2    Collection Time: 02/22/23  8:54 AM   Result Value Ref Range    SARS-CoV-2 by PCR Please find results under separate order     INFLUENZA A+B VIRAL AGS    Collection Time: 02/22/23  8:54 AM   Result Value Ref Range    Influenza A Antigen Negative NEG      Influenza B Antigen Negative NEG     STREP AG SCREEN, GROUP A    Collection Time: 02/22/23  8:54 AM    Specimen: Swab; Throat   Result Value Ref Range    Group A Strep Ag ID Negative NEG          EKG:  When ordered, EKG's are interpreted by the Emergency Department Physician in the absence of a cardiologist.  Please see their note for interpretation of EKG. RADIOLOGY:  Non-plain film images such as CT, Ultrasound and MRI are read by the radiologist. Plain radiographic images are visualized and preliminarily interpreted by the ED Provider with the below findings:     No PNA, PTX, or effusion     Interpretation per the Radiologist below, if available at the time of this note:     XR CHEST PORT    Result Date: 2/22/2023  EXAM:  XR CHEST PORT INDICATION: Cough COMPARISON: November 15, 2022 TECHNIQUE: portable chest AP view FINDINGS: The cardiac silhouette is within normal limits. The pulmonary vasculature is within normal limits. The lungs and pleural spaces are clear. The visualized bones and upper abdomen are age-appropriate. No acute process on portable chest.        PROCEDURES   Unless otherwise noted below, none  Procedures     CRITICAL CARE TIME       EMERGENCY DEPARTMENT COURSE and DIFFERENTIAL DIAGNOSIS/MDM   Vitals:    Vitals:    02/22/23 0846 02/22/23 0906   BP: (!) 156/109    Pulse: 77    Resp: 20    Temp: 98.1 °F (36.7 °C)    SpO2: 99% 99%   Weight: 105.2 kg (232 lb)    Height: 5' 8\" (1.727 m)         Patient was given the following medications:  Medications - No data to display    CONSULTS: (Who and What was discussed)  None    Chronic Conditions: DM, HTN, asthma    Social Determinants affecting Dx or Tx: None    Records Reviewed (source and summary of external records): Prior medical records and Previous Radiology studies    CC/HPI Summary, DDx, ED Course, and Reassessment:     56-year-old female with diabetes, hypertension, asthma presents with 3 days of cough, nasal congestion. She complains of pain associated with coughing episodes. Here in the ED she is mildly ill-appearing but nontoxic and in no acute distress. Slightly hypertensive, vital signs stable. Heart rate is regular, lungs are CTA B without wheezing, rales, or decreased breath sounds.   Chest x-ray shows no evidence of PNA. Presentation most suggestive of viral URI. Low suspicion for bacterial etiology at this time. Not suspicious for PE, ACS, PTX. Testing for flu and strep negative today. COVID test is pending at time of discharge, otherwise a low suspicion for this since she was positive in November of last year. She is agreeable to discharge with a prescription for prednisone and Tessalon Perles. Advised on medication use and supportive care at home. Follow-up with PCP. ED return precautions reviewed. Disposition Considerations (Tests not done, Shared Decision Making, Pt Expectation of Test or Tx.): as above     FINAL IMPRESSION     1. Upper respiratory tract infection, unspecified type    2. Person under investigation for COVID-19          DISPOSITION/PLAN   Discharged    Discharge Note: The patient is stable for discharge home. The signs, symptoms, diagnosis, and discharge instructions have been discussed, understanding conveyed, and agreed upon. The patient is to follow up as recommended or return to ER should their symptoms worsen. PATIENT REFERRED TO:  Follow-up Information       Follow up With Specialties Details Why Contact Info    [facility] EMERGENCY DEPT Emergency Medicine  As needed, If symptoms worsen [address] [place] [address]  [address]  [phone]    Your PCP  Call  For follow up               DISCHARGE MEDICATIONS:  Discharge Medication List as of 2/22/2023 10:25 AM        START taking these medications    Details   predniSONE (DELTASONE) 20 mg tablet Take 1 Tablet by mouth daily for 5 days. With Breakfast, Normal, Disp-5 Tablet, R-0      benzonatate (Tessalon Perles) 100 mg capsule Take 1 Capsule by mouth three (3) times daily as needed for Cough for up to 7 days. , Normal, Disp-30 Capsule, R-0           CONTINUE these medications which have NOT CHANGED    Details   albuterol (PROVENTIL HFA, VENTOLIN HFA, PROAIR HFA) 90 mcg/actuation inhaler Take 2 Puffs by inhalation every four (4) hours as needed for Wheezing., Normal, Disp-1 Each, R-0      predniSONE (STERAPRED DS) 10 mg dose pack Take as directed on packaging label for a 6-day taper, Normal, Disp-1 Dose Pack, R-0      gabapentin (NEURONTIN) 400 mg capsule Take 400 mg by mouth daily. , Historical Med      linaCLOtide (LINZESS) 145 mcg cap capsule Take 145 mcg by mouth daily. , Historical Med      ondansetron (ZOFRAN ODT) 4 mg disintegrating tablet Take 1 Tablet by mouth every eight (8) hours as needed for Nausea or Vomiting., Normal, Disp-15 Tablet, R-0      lisinopril (PRINIVIL, ZESTRIL) 10 mg tablet Take 1 Tab by mouth daily. Print, 10 mg, Disp-90 Tab, R-3               DISCONTINUED MEDICATIONS:  Discharge Medication List as of 2/22/2023 10:25 AM          ED Attending Involvement : I have seen and evaluated the patient. My supervision physician was available for consultation. I am the Primary Clinician of Record. [place], [place] (electronically signed)    (Please note that parts of this dictation were completed with voice recognition software. Quite often unanticipated grammatical, syntax, homophones, and other interpretive errors are inadvertently transcribed by the computer software. Please disregards these errors.  Please excuse any errors that have escaped final proofreading.)

## 2023-02-24 LAB
BACTERIA SPEC CULT: ABNORMAL
BACTERIA SPEC CULT: ABNORMAL
SERVICE CMNT-IMP: ABNORMAL

## 2023-03-06 ENCOUNTER — HOSPITAL ENCOUNTER (EMERGENCY)
Age: 58
Discharge: HOME | End: 2023-03-06
Attending: EMERGENCY MEDICINE
Payer: COMMERCIAL

## 2023-03-06 VITALS
SYSTOLIC BLOOD PRESSURE: 139 MMHG | HEIGHT: 68 IN | HEART RATE: 102 BPM | BODY MASS INDEX: 35.16 KG/M2 | DIASTOLIC BLOOD PRESSURE: 96 MMHG | TEMPERATURE: 97.7 F | RESPIRATION RATE: 18 BRPM | WEIGHT: 232 LBS | OXYGEN SATURATION: 95 %

## 2023-03-06 DIAGNOSIS — M47.9 SPONDYLOSIS: ICD-10-CM

## 2023-03-06 DIAGNOSIS — S39.012A STRAIN OF LUMBAR REGION, INITIAL ENCOUNTER: Primary | ICD-10-CM

## 2023-03-06 DIAGNOSIS — M54.32 BILATERAL SCIATICA: ICD-10-CM

## 2023-03-06 DIAGNOSIS — M54.16 LUMBAR RADICULOPATHY: ICD-10-CM

## 2023-03-06 DIAGNOSIS — M54.31 BILATERAL SCIATICA: ICD-10-CM

## 2023-03-06 PROCEDURE — 74011250637 HC RX REV CODE- 250/637: Performed by: PHYSICIAN ASSISTANT

## 2023-03-06 PROCEDURE — 74011250636 HC RX REV CODE- 250/636: Performed by: PHYSICIAN ASSISTANT

## 2023-03-06 PROCEDURE — 99284 EMERGENCY DEPT VISIT MOD MDM: CPT

## 2023-03-06 PROCEDURE — 74011636637 HC RX REV CODE- 636/637: Performed by: PHYSICIAN ASSISTANT

## 2023-03-06 PROCEDURE — 74011000250 HC RX REV CODE- 250: Performed by: PHYSICIAN ASSISTANT

## 2023-03-06 PROCEDURE — 96372 THER/PROPH/DIAG INJ SC/IM: CPT

## 2023-03-06 RX ORDER — LIDOCAINE 4 G/100G
1 PATCH TOPICAL
Status: DISCONTINUED | OUTPATIENT
Start: 2023-03-06 | End: 2023-03-06 | Stop reason: HOSPADM

## 2023-03-06 RX ORDER — METHOCARBAMOL 750 MG/1
750 TABLET, FILM COATED ORAL 4 TIMES DAILY
Qty: 20 TABLET | Refills: 0 | Status: SHIPPED | OUTPATIENT
Start: 2023-03-06

## 2023-03-06 RX ORDER — LIDOCAINE 50 MG/G
PATCH TOPICAL
Qty: 15 EACH | Refills: 0 | Status: SHIPPED | OUTPATIENT
Start: 2023-03-06

## 2023-03-06 RX ORDER — DIAZEPAM 5 MG/1
5 TABLET ORAL
Status: COMPLETED | OUTPATIENT
Start: 2023-03-06 | End: 2023-03-06

## 2023-03-06 RX ORDER — ACETAMINOPHEN 500 MG
1000 TABLET ORAL
Qty: 20 TABLET | Refills: 0 | Status: SHIPPED | OUTPATIENT
Start: 2023-03-06

## 2023-03-06 RX ORDER — PREDNISONE 10 MG/1
TABLET ORAL
Qty: 48 TABLET | Refills: 0 | Status: SHIPPED | OUTPATIENT
Start: 2023-03-06

## 2023-03-06 RX ORDER — PREDNISONE 20 MG/1
60 TABLET ORAL
Status: COMPLETED | OUTPATIENT
Start: 2023-03-06 | End: 2023-03-06

## 2023-03-06 RX ORDER — KETOROLAC TROMETHAMINE 30 MG/ML
15 INJECTION, SOLUTION INTRAMUSCULAR; INTRAVENOUS
Status: COMPLETED | OUTPATIENT
Start: 2023-03-06 | End: 2023-03-06

## 2023-03-06 RX ADMIN — KETOROLAC TROMETHAMINE 15 MG: 30 INJECTION, SOLUTION INTRAMUSCULAR; INTRAVENOUS at 14:55

## 2023-03-06 RX ADMIN — DIAZEPAM 5 MG: 5 TABLET ORAL at 14:55

## 2023-03-06 RX ADMIN — PREDNISONE 60 MG: 20 TABLET ORAL at 14:55

## 2023-03-06 NOTE — ED TRIAGE NOTES
Patient arrives to ED for c/o lower back pain since yesterday. Patient states she suffers from chronic back pain but something triggered flare up. Patient tearful and guarding lower back.

## 2023-03-06 NOTE — DISCHARGE INSTRUCTIONS
It was a pleasure taking care of you at [facility] Emergency Department today. We know that when you come to [facility], you are entrusting us with your health, comfort, and safety. Our physicians and nurses honor that trust, and we truly appreciate the opportunity to care for you and your loved ones. We also value our feedback. If you receive a survey about your Emergency Department experience today, please fill it out. We care about our patients' feedback, and we listen to what you have to say. Thank you!

## 2023-03-06 NOTE — Clinical Note
[facility] EMERGENCY DEPT  [address] [address] [phone]    Work/School Note    Date: 3/6/2023    To Whom It May concern:    Gricelda Ibanez was seen and treated today in the emergency room by the following provider(s):  Attending Provider: Omi Rosales MD  Physician Assistant: Moustapha Horta PA-C. Gricelda Ibanez is excused from work/school on 03/06/23 and 03/07/23. She is medically clear to return to work/school on 3/8/2023.        Sincerely,          Nithin Rasmussen PA-C

## 2023-03-06 NOTE — ED NOTES
Discharge instructions were given to the patient by Jean Lazo RN. The patient left the Emergency Department ambulatory, alert and oriented and in no acute distress with 4 prescriptions. The patient was encouraged to call or return to the ED for worsening issues or problems and was encouraged to schedule a follow up appointment for continuing care. The patient verbalized understanding of discharge instructions and prescriptions, all questions were answered. The patient has no further concerns at this time.

## 2023-03-06 NOTE — ED NOTES
Pt presents ambulatory to ED complaining of chronic lower back pain that she believes is related to her sciatica. Pt reports using steroids in the past to help with this. Pt is alert and oriented x 4, RR even and unlabored, skin is warm and dry. Assesment completed and pt updated on plan of care. Emergency Department Nursing Plan of Care       The Nursing Plan of Care is developed from the Nursing assessment and Emergency Department Attending provider initial evaluation. The plan of care may be reviewed in the ED Provider note.     The Plan of Care was developed with the following considerations:   Patient / Family readiness to learn indicated by:verbalized understanding  Persons(s) to be included in education: patient  Barriers to Learning/Limitations:No    Signed     Lele Casanova RN    3/6/2023   4:01 PM

## 2023-03-06 NOTE — ED PROVIDER NOTES
[facility] EMERGENCY DEPT  EMERGENCY DEPARTMENT ENCOUNTER       Pt Name: Deidra Benites  MRN: [id number]  [place] 1965  Date of evaluation: 3/6/2023  Provider: Rancho Cavanaugh PA-C   PCP: None  Note Started: 3:24 PM 3/6/23     CHIEF COMPLAINT       Chief Complaint   Patient presents with    Back Pain        HISTORY OF PRESENT ILLNESS: 1 or more elements      History From: Patient  HPI Limitations : None     Deidra Benites is a 65 y.o. female with medical history significant for hypertension, diabetes (resolved/diet-controlled), asthma, Ménière's, GERD who presents via self with complaints of acute on chronic moderate aching bilateral low back pain radiating down bilateral lower extremities right greater than left X 2 days. 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 Denies any direct injury or trauma. Over-the-counter ibuprofen without relief. Additionally endorses taking gabapentin and baclofen without relief. Endorses history of lumbar radiculopathy, lumbar spondylosis and sciatica. She was followed by spine doctor/surgeon in [place] but has not yet followed up in the [facility]. Per chart review, patient was followed by neuro interventional surgery at [facility] in 2019. She denies any fever, chills, nausea, vomiting, saddle paresthesias, incontinence, urinary retention, gait abnormalities, focal weakness, abdominal pain, chest pain, shortness of breath. No history of active diabetes/diabetes requiring medication or IV drug abuse. Nursing Notes were all reviewed and agreed with or any disagreements were addressed in the HPI. REVIEW OF SYSTEMS      Review of Systems   Constitutional:  Negative for activity change, appetite change, chills, fatigue and fever. HENT: Negative. Eyes: Negative. Negative for visual disturbance. Respiratory:  Negative for cough and shortness of breath. Cardiovascular: Negative. Negative for chest pain. Gastrointestinal: Negative. Negative for abdominal pain, constipation, diarrhea, nausea and vomiting. Genitourinary: Negative. Negative for difficulty urinating, dysuria, flank pain, frequency and pelvic pain. Musculoskeletal:  Positive for back pain. Negative for arthralgias, gait problem, joint swelling, myalgias, neck pain and neck stiffness. Skin: Negative. Negative for color change, pallor, rash and wound. Neurological: Negative. Negative for weakness, numbness and headaches. Psychiatric/Behavioral: Negative. Positives and Pertinent negatives as per HPI. PAST HISTORY     Past Medical History:  Past Medical History:   Diagnosis Date    Asthma     Diabetes ([facility] [address]     GERD (gastroesophageal reflux disease)     Hypertension     Other ill-defined conditions(799.89)     meinere's disease       Past Surgical History:  Past Surgical History:   Procedure Laterality Date    HX TUBAL LIGATION  1993       Family History:  Family History   Problem Relation Age of Onset    Diabetes Mother     Hypertension Mother     Heart Disease Father        Social History:  Social History     Tobacco Use    Smoking status: Never     Passive exposure: Never    Smokeless tobacco: Never   Substance Use Topics    Alcohol use: No    Drug use: No       Allergies: Allergies   Allergen Reactions    Erythromycin Base Nausea and Vomiting    Tylenol-Codeine #3 [Acetaminophen-Codeine] Itching     Reports can take Tylenol Just codeine causes itching       CURRENT MEDICATIONS      Previous Medications    ALBUTEROL (PROVENTIL HFA, VENTOLIN HFA, PROAIR HFA) 90 MCG/ACTUATION INHALER    Take 2 Puffs by inhalation every four (4) hours as needed for Wheezing. GABAPENTIN (NEURONTIN) 400 MG CAPSULE    Take 400 mg by mouth daily. LINACLOTIDE (LINZESS) 145 MCG CAP CAPSULE    Take 145 mcg by mouth daily. LISINOPRIL (PRINIVIL, ZESTRIL) 10 MG TABLET    Take 1 Tab by mouth daily.     ONDANSETRON (ZOFRAN ODT) 4 MG DISINTEGRATING TABLET    Take 1 Tablet by mouth every eight (8) hours as needed for Nausea or Vomiting. SCREENINGS               No data recorded         PHYSICAL EXAM      ED Triage Vitals [03/06/23 1406]   ED Encounter Vitals Group      BP (!) 139/96      Pulse (Heart Rate) (!) 111      Resp Rate 18      Temp 97.7 °F (36.5 °C)      Temp src       O2 Sat (%) 95 %      Weight 232 lb      Height 5' 8\"        Physical Exam  Vitals and nursing note reviewed. Constitutional:       General: She is not in acute distress. Appearance: Normal appearance. She is well-developed. She is not ill-appearing, toxic-appearing or diaphoretic. Comments: Anxious appearing female standing upright in room in no apparent distress. She is unwilling to sit as she endorses that it is too painful to go from the sitting to standing position. HENT:      Head: Normocephalic and atraumatic. Right Ear: Hearing and external ear normal.      Left Ear: Hearing and external ear normal.      Nose: Nose normal.   Eyes:      Extraocular Movements: Extraocular movements intact. Conjunctiva/sclera: Conjunctivae normal.   Cardiovascular:      Rate and Rhythm: Regular rhythm. Tachycardia present. Pulses:           Radial pulses are 2+ on the right side and 2+ on the left side. Posterior tibial pulses are 2+ on the right side and 2+ on the left side. Pulmonary:      Effort: Pulmonary effort is normal. No respiratory distress. Breath sounds: Normal breath sounds. Musculoskeletal:         General: Normal range of motion. Cervical back: Normal and normal range of motion. Thoracic back: Normal.      Lumbar back: Tenderness (bilateral lumbar paravertebral mm) present. No swelling, edema, deformity, signs of trauma, lacerations, spasms or bony tenderness. Normal range of motion. No scoliosis.       Comments: No crepitus, deformity, step-off, edema, instability, spinal or midline tenderness to palpation. Full range of motion of spine as well as extremities. Neurovascular intact bilateral lower extremities. Steady gait. Normal intact bilateral thigh adduction/flexion/extension, knee flexion and extension, ankle and foot plantar and dorsiflexion. Normal patellar reflex. Intact sensations to bilateral lower extremities. Skin:     General: Skin is warm and dry. Capillary Refill: Capillary refill takes less than 2 seconds. Findings: No rash. Neurological:      Mental Status: She is alert and oriented to person, place, and time. Sensory: Sensation is intact. Motor: Motor function is intact. No weakness. Gait: Gait is intact. Psychiatric:         Mood and Affect: Mood is anxious. Behavior: Behavior normal.         Thought Content: Thought content normal.         Judgment: Judgment normal.       Pulse Oximetry Analysis - Normal 95% on RA       DIAGNOSTIC RESULTS   LABS:     No results found for this or any previous visit (from the past 12 hour(s)). EKG: When ordered, EKG's are interpreted by the Emergency Department Physician in the absence of a cardiologist.  Please see their note for interpretation of EKG. RADIOLOGY:  Non-plain film images such as CT, Ultrasound and MRI are read by the radiologist. Plain radiographic images are visualized and preliminarily interpreted by the ED Provider with the below findings:          Interpretation per the Radiologist below, if available at the time of this note:     No results found. PROCEDURES   Unless otherwise noted below, none  Procedures     CRITICAL CARE TIME   none    EMERGENCY DEPARTMENT COURSE and DIFFERENTIAL DIAGNOSIS/MDM   Initial assessment performed. The patients presenting problems have been discussed, and they are in agreement with the care plan formulated and outlined with them. I have encouraged them to ask questions as they arise throughout their visit.     Vitals:    Vitals:    03/06/23 1406 03/06/23 1518 03/06/23 1545 03/06/23 1546   BP: (!) 139/96      Pulse: (!) 111 (!) 118 (!) 109 (!) 102   Resp: 18      Temp: 97.7 °F (36.5 °C)      SpO2: 95%      Weight: 105.2 kg (232 lb)      Height: 5' 8\" (1.727 m)           Patient was given the following medications:  Medications   lidocaine 4 % patch 1 Patch (1 Patch TransDERmal Apply Patch 3/6/23 1455)   ketorolac (TORADOL) injection 15 mg (15 mg IntraMUSCular Given 3/6/23 1455)   diazePAM (VALIUM) tablet 5 mg (5 mg Oral Given 3/6/23 1455)   predniSONE (DELTASONE) tablet 60 mg (60 mg Oral Given 3/6/23 1455)       CONSULTS: (Who and What was discussed)  None      Chronic Conditions: Hypertension, diet-controlled diabetes, asthma, Ménière's disease, GERD    Social Determinants affecting Dx or Tx: None    Records Reviewed (source and summary): Prior medical records, Previous Radiology studies, and Nursing notes    CC/HPI Summary, DDx, ED Course, and Reassessment: Anxious but well-appearing afebrile and hemodynamically stable 60-year-old female presents with acute on chronic low back pain radiating down bilateral extremities. Endorses symptoms started yesterday when she went from a sitting to standing position. Denies any direct injury or trauma. On exam, patient has mild tenderness to lumbar paravertebral muscles, but no focal neurological deficit or spinal deformity. She is afebrile and does not have any history of diabetes requiring medications or IV drug use. These symptoms are consistent with a lumbar strain. Less likely  pathology, aortic dissection or ruptured AAA, or cauda equina given that there are no red flags and a benign physical exam.     I have recommended rest, avoiding heavy lifting until better, use of intermittent heat (avoid sleeping on a heating pad), and use of OTC NSAID's (Advil, Aleve etc) or Tylenol prn for pain. Call PCP if back pain persists or she develops leg symptoms.   It has also been explained that this may take up to three months to fully resolved and that smoking may slow that process even more. ED Course as of 03/06/23 1548   Mon Mar 06, 2023   1530 Patient is very anxious talking on the phone to someone which is causing her heart rate to increase. Requested patient to hang up the phone call and rest calmly in room to see if her symptoms improve and we can recheck her heart rate once her anxiety has improved. [SM]      ED Course User Index  [SM] Kathie Nolen PA-C         Disposition Considerations (Tests not done, Shared Decision Making, Pt Expectation of Test or Tx.):      Progress Note:   Updated pt on all returned results and findings. Discussed the importance of proper follow up as referred below along with return precautions. Pt in agreement with the care plan and expresses agreement with and understanding of all items discussed. FINAL IMPRESSION     1. Strain of lumbar region, initial encounter    2. Lumbar radiculopathy    3. Spondylosis    4. Bilateral sciatica          DISPOSITION/PLAN   Jf Guerra's  results have been reviewed with her. She has been counseled regarding her diagnosis, treatment, and plan. She verbally conveys understanding and agreement of the signs, symptoms, diagnosis, treatment and prognosis and additionally agrees to follow up as discussed. She also agrees with the care-plan and conveys that all of her questions have been answered. I have also provided discharge instructions for her that include: educational information regarding their diagnosis and treatment, and list of reasons why they would want to return to the ED prior to their follow-up appointment, should her condition change. Discharged    3:48 PM    I have discussed with patient their diagnosis, treatment, and follow up plan. The patient agrees to follow up as outlined in discharge paperwork and also to return to the ED with any worsening.  Kalli Fernandez PA-C         PATIENT REFERRED TO:  Follow-up Information Follow up With Specialties Details Why Contact Info    [facility]  Schedule an appointment as soon as possible for a visit in 1 week As needed [address] [facility] [address] [facility] [address]  Schedule an appointment as soon as possible for a visit in 1 week As needed [address]  Schedule an appointment as soon as possible for a visit in 1 week As needed [address] [place] [address] [address]  [phone]    [address] [facility] DEPT Emergency Medicine Go to  As needed, If symptoms worsen [address] [facility]  [phone]              DISCHARGE MEDICATIONS:  Current Discharge Medication List        START taking these medications    Details   predniSONE (STERAPRED DS) 10 mg dose pack See administration instruction per 10mg dose pack  Qty: 48 Tablet, Refills: 0  Start date: 3/6/2023      acetaminophen (TYLENOL) 500 mg tablet Take 2 Tablets by mouth every six (6) hours as needed for Pain. Qty: 20 Tablet, Refills: 0  Start date: 3/6/2023      methocarbamoL (ROBAXIN) 750 mg tablet Take 1 Tablet by mouth four (4) times daily. Qty: 20 Tablet, Refills: 0  Start date: 3/6/2023      lidocaine (LIDODERM) 5 % Apply patch to the affected area for 12 hours a day and remove for 12 hours a day. Qty: 15 Each, Refills: 0  Start date: 3/6/2023               DISCONTINUED MEDICATIONS:  Current Discharge Medication List          Shared Not Shared SHRAVAN: I have seen and evaluated the patient. My supervision physician was available for consultation. I am the Primary Clinician of Record. Erica Charlton PA-C (electronically signed)    (Please note that parts of this dictation were completed with voice recognition software.  Quite often unanticipated grammatical, syntax, homophones, and other interpretive errors are inadvertently transcribed by the computer software. Please disregards these errors.  Please excuse any errors that have escaped final proofreading.)

## 2023-05-20 RX ORDER — ALBUTEROL SULFATE 90 UG/1
2 AEROSOL, METERED RESPIRATORY (INHALATION) EVERY 4 HOURS PRN
COMMUNITY
Start: 2022-11-15 | End: 2023-06-19 | Stop reason: SDUPTHER

## 2023-05-20 RX ORDER — ACETAMINOPHEN 500 MG
1000 TABLET ORAL EVERY 6 HOURS PRN
COMMUNITY
Start: 2023-03-06 | End: 2023-06-19

## 2023-05-20 RX ORDER — ONDANSETRON 4 MG/1
4 TABLET, ORALLY DISINTEGRATING ORAL EVERY 8 HOURS PRN
COMMUNITY
Start: 2022-09-20 | End: 2023-06-19

## 2023-05-20 RX ORDER — LIDOCAINE 50 MG/G
PATCH TOPICAL
COMMUNITY
Start: 2023-03-06

## 2023-05-20 RX ORDER — PREDNISONE 10 MG/1
TABLET ORAL
COMMUNITY
Start: 2023-03-06 | End: 2023-06-19

## 2023-05-20 RX ORDER — METHOCARBAMOL 750 MG/1
750 TABLET, FILM COATED ORAL 4 TIMES DAILY
COMMUNITY
Start: 2023-03-06 | End: 2023-06-19

## 2023-05-20 RX ORDER — GABAPENTIN 400 MG/1
400 CAPSULE ORAL DAILY
COMMUNITY
End: 2023-06-19 | Stop reason: SDUPTHER

## 2023-05-20 RX ORDER — LISINOPRIL 10 MG/1
10 TABLET ORAL DAILY
COMMUNITY
Start: 2013-07-30 | End: 2023-06-19

## 2023-06-19 ENCOUNTER — OFFICE VISIT (OUTPATIENT)
Age: 58
End: 2023-06-19
Payer: COMMERCIAL

## 2023-06-19 VITALS
RESPIRATION RATE: 17 BRPM | WEIGHT: 235 LBS | HEIGHT: 68 IN | HEART RATE: 76 BPM | SYSTOLIC BLOOD PRESSURE: 130 MMHG | OXYGEN SATURATION: 96 % | BODY MASS INDEX: 35.61 KG/M2 | TEMPERATURE: 97.8 F | DIASTOLIC BLOOD PRESSURE: 78 MMHG

## 2023-06-19 DIAGNOSIS — J45.20 MILD INTERMITTENT ASTHMA WITHOUT COMPLICATION: ICD-10-CM

## 2023-06-19 DIAGNOSIS — M54.16 LUMBAR RADICULOPATHY: Primary | ICD-10-CM

## 2023-06-19 DIAGNOSIS — E11.9 CONTROLLED TYPE 2 DIABETES MELLITUS WITHOUT COMPLICATION, WITHOUT LONG-TERM CURRENT USE OF INSULIN (HCC): ICD-10-CM

## 2023-06-19 DIAGNOSIS — H90.3 SENSORINEURAL HEARING LOSS (SNHL) OF BOTH EARS: ICD-10-CM

## 2023-06-19 DIAGNOSIS — K58.1 IRRITABLE BOWEL SYNDROME WITH CONSTIPATION: ICD-10-CM

## 2023-06-19 DIAGNOSIS — E78.2 MIXED HYPERLIPIDEMIA: ICD-10-CM

## 2023-06-19 DIAGNOSIS — F41.9 ANXIETY: ICD-10-CM

## 2023-06-19 DIAGNOSIS — I10 PRIMARY HYPERTENSION: ICD-10-CM

## 2023-06-19 DIAGNOSIS — G93.2 BENIGN INTRACRANIAL HYPERTENSION: ICD-10-CM

## 2023-06-19 DIAGNOSIS — E55.9 VITAMIN D DEFICIENCY: ICD-10-CM

## 2023-06-19 DIAGNOSIS — E03.9 HYPOTHYROIDISM, UNSPECIFIED TYPE: ICD-10-CM

## 2023-06-19 PROBLEM — K21.9 GASTROESOPHAGEAL REFLUX DISEASE: Status: ACTIVE | Noted: 2019-10-16

## 2023-06-19 PROBLEM — J45.909 ASTHMA: Status: ACTIVE | Noted: 2019-10-16

## 2023-06-19 PROBLEM — R32 INCONTINENCE: Status: ACTIVE | Noted: 2019-10-16

## 2023-06-19 PROBLEM — K14.1 GEOGRAPHICAL TONGUE: Status: ACTIVE | Noted: 2018-11-07

## 2023-06-19 PROBLEM — E78.5 HYPERLIPIDEMIA: Status: ACTIVE | Noted: 2019-10-16

## 2023-06-19 PROBLEM — F41.8 MIXED ANXIETY AND DEPRESSIVE DISORDER: Status: ACTIVE | Noted: 2019-10-16

## 2023-06-19 PROBLEM — G62.9 NEUROPATHY: Status: ACTIVE | Noted: 2019-10-16

## 2023-06-19 PROBLEM — H43.392 FLOATERS, LEFT: Status: ACTIVE | Noted: 2023-01-31

## 2023-06-19 PROBLEM — A04.8 HELICOBACTER PYLORI GASTROINTESTINAL TRACT INFECTION: Status: ACTIVE | Noted: 2019-10-16

## 2023-06-19 PROBLEM — N20.0 KIDNEY STONE: Status: ACTIVE | Noted: 2019-10-16

## 2023-06-19 PROCEDURE — 99204 OFFICE O/P NEW MOD 45 MIN: CPT | Performed by: STUDENT IN AN ORGANIZED HEALTH CARE EDUCATION/TRAINING PROGRAM

## 2023-06-19 PROCEDURE — 3078F DIAST BP <80 MM HG: CPT | Performed by: STUDENT IN AN ORGANIZED HEALTH CARE EDUCATION/TRAINING PROGRAM

## 2023-06-19 PROCEDURE — 3075F SYST BP GE 130 - 139MM HG: CPT | Performed by: STUDENT IN AN ORGANIZED HEALTH CARE EDUCATION/TRAINING PROGRAM

## 2023-06-19 RX ORDER — GABAPENTIN 400 MG/1
400 CAPSULE ORAL
Qty: 90 CAPSULE | Refills: 0
Start: 2023-06-19 | End: 2023-06-19 | Stop reason: SDUPTHER

## 2023-06-19 RX ORDER — BUTALBITAL, ACETAMINOPHEN AND CAFFEINE 50; 325; 40 MG/1; MG/1; MG/1
TABLET ORAL
COMMUNITY
End: 2023-06-19 | Stop reason: SDUPTHER

## 2023-06-19 RX ORDER — DICYCLOMINE HYDROCHLORIDE 10 MG/1
10 CAPSULE ORAL 4 TIMES DAILY
Qty: 30 CAPSULE | Refills: 0
Start: 2023-06-19

## 2023-06-19 RX ORDER — BUTALBITAL, ACETAMINOPHEN AND CAFFEINE 50; 325; 40 MG/1; MG/1; MG/1
TABLET ORAL
Qty: 30 TABLET | Refills: 2
Start: 2023-06-19

## 2023-06-19 RX ORDER — ALBUTEROL SULFATE 90 UG/1
2 AEROSOL, METERED RESPIRATORY (INHALATION) EVERY 4 HOURS PRN
Qty: 18 G | Refills: 5 | Status: SHIPPED | OUTPATIENT
Start: 2023-06-19

## 2023-06-19 RX ORDER — LOSARTAN POTASSIUM 50 MG/1
50 TABLET ORAL DAILY
Qty: 90 TABLET | Refills: 1 | Status: SHIPPED | OUTPATIENT
Start: 2023-06-19 | End: 2023-12-16

## 2023-06-19 RX ORDER — BUTALBITAL, ACETAMINOPHEN AND CAFFEINE 50; 325; 40 MG/1; MG/1; MG/1
TABLET ORAL
Qty: 30 TABLET | Refills: 2
Start: 2023-06-19 | End: 2023-06-19 | Stop reason: SDUPTHER

## 2023-06-19 RX ORDER — GABAPENTIN 400 MG/1
400 CAPSULE ORAL
Qty: 90 CAPSULE | Refills: 1 | Status: SHIPPED | OUTPATIENT
Start: 2023-06-19 | End: 2023-12-16

## 2023-06-19 RX ORDER — BUDESONIDE AND FORMOTEROL FUMARATE DIHYDRATE 160; 4.5 UG/1; UG/1
AEROSOL RESPIRATORY (INHALATION)
COMMUNITY
Start: 2021-07-15

## 2023-06-19 RX ORDER — LOSARTAN POTASSIUM 50 MG/1
50 TABLET ORAL DAILY
Qty: 90 TABLET | Refills: 0
Start: 2023-06-19 | End: 2023-06-19 | Stop reason: SDUPTHER

## 2023-06-19 RX ORDER — METHOCARBAMOL 750 MG/1
750 TABLET, FILM COATED ORAL
Qty: 30 TABLET | Refills: 2 | Status: SHIPPED | OUTPATIENT
Start: 2023-06-19

## 2023-06-19 SDOH — ECONOMIC STABILITY: FOOD INSECURITY: WITHIN THE PAST 12 MONTHS, THE FOOD YOU BOUGHT JUST DIDN'T LAST AND YOU DIDN'T HAVE MONEY TO GET MORE.: OFTEN TRUE

## 2023-06-19 SDOH — ECONOMIC STABILITY: FOOD INSECURITY: WITHIN THE PAST 12 MONTHS, YOU WORRIED THAT YOUR FOOD WOULD RUN OUT BEFORE YOU GOT MONEY TO BUY MORE.: OFTEN TRUE

## 2023-06-19 SDOH — HEALTH STABILITY: PHYSICAL HEALTH: ON AVERAGE, HOW MANY MINUTES DO YOU ENGAGE IN EXERCISE AT THIS LEVEL?: 60 MIN

## 2023-06-19 SDOH — ECONOMIC STABILITY: TRANSPORTATION INSECURITY
IN THE PAST 12 MONTHS, HAS THE LACK OF TRANSPORTATION KEPT YOU FROM MEDICAL APPOINTMENTS OR FROM GETTING MEDICATIONS?: NO

## 2023-06-19 SDOH — ECONOMIC STABILITY: INCOME INSECURITY: HOW HARD IS IT FOR YOU TO PAY FOR THE VERY BASICS LIKE FOOD, HOUSING, MEDICAL CARE, AND HEATING?: VERY HARD

## 2023-06-19 SDOH — ECONOMIC STABILITY: TRANSPORTATION INSECURITY
IN THE PAST 12 MONTHS, HAS LACK OF TRANSPORTATION KEPT YOU FROM MEETINGS, WORK, OR FROM GETTING THINGS NEEDED FOR DAILY LIVING?: NO

## 2023-06-19 SDOH — ECONOMIC STABILITY: HOUSING INSECURITY
IN THE LAST 12 MONTHS, WAS THERE A TIME WHEN YOU DID NOT HAVE A STEADY PLACE TO SLEEP OR SLEPT IN A SHELTER (INCLUDING NOW)?: NO

## 2023-06-19 SDOH — HEALTH STABILITY: PHYSICAL HEALTH: ON AVERAGE, HOW MANY DAYS PER WEEK DO YOU ENGAGE IN MODERATE TO STRENUOUS EXERCISE (LIKE A BRISK WALK)?: 5 DAYS

## 2023-06-19 ASSESSMENT — PATIENT HEALTH QUESTIONNAIRE - PHQ9
SUM OF ALL RESPONSES TO PHQ QUESTIONS 1-9: 0
SUM OF ALL RESPONSES TO PHQ QUESTIONS 1-9: 0
2. FEELING DOWN, DEPRESSED OR HOPELESS: 0
1. LITTLE INTEREST OR PLEASURE IN DOING THINGS: 0
SUM OF ALL RESPONSES TO PHQ QUESTIONS 1-9: 0
SUM OF ALL RESPONSES TO PHQ9 QUESTIONS 1 & 2: 0
SUM OF ALL RESPONSES TO PHQ QUESTIONS 1-9: 0

## 2023-06-19 ASSESSMENT — SOCIAL DETERMINANTS OF HEALTH (SDOH)
WITHIN THE LAST YEAR, HAVE YOU BEEN HUMILIATED OR EMOTIONALLY ABUSED IN OTHER WAYS BY YOUR PARTNER OR EX-PARTNER?: NO
HOW OFTEN DO YOU ATTEND CHURCH OR RELIGIOUS SERVICES?: MORE THAN 4 TIMES PER YEAR
WITHIN THE LAST YEAR, HAVE TO BEEN RAPED OR FORCED TO HAVE ANY KIND OF SEXUAL ACTIVITY BY YOUR PARTNER OR EX-PARTNER?: NO
WITHIN THE LAST YEAR, HAVE YOU BEEN KICKED, HIT, SLAPPED, OR OTHERWISE PHYSICALLY HURT BY YOUR PARTNER OR EX-PARTNER?: NO
HOW OFTEN DO YOU ATTENT MEETINGS OF THE CLUB OR ORGANIZATION YOU BELONG TO?: MORE THAN 4 TIMES PER YEAR
WITHIN THE LAST YEAR, HAVE YOU BEEN AFRAID OF YOUR PARTNER OR EX-PARTNER?: NO
IN A TYPICAL WEEK, HOW MANY TIMES DO YOU TALK ON THE PHONE WITH FAMILY, FRIENDS, OR NEIGHBORS?: MORE THAN THREE TIMES A WEEK
HOW OFTEN DO YOU GET TOGETHER WITH FRIENDS OR RELATIVES?: TWICE A WEEK
DO YOU BELONG TO ANY CLUBS OR ORGANIZATIONS SUCH AS CHURCH GROUPS UNIONS, FRATERNAL OR ATHLETIC GROUPS, OR SCHOOL GROUPS?: YES

## 2023-06-19 ASSESSMENT — LIFESTYLE VARIABLES
HOW OFTEN DO YOU HAVE A DRINK CONTAINING ALCOHOL: NEVER
HOW MANY STANDARD DRINKS CONTAINING ALCOHOL DO YOU HAVE ON A TYPICAL DAY: PATIENT DOES NOT DRINK

## 2023-06-19 NOTE — PROGRESS NOTES
[address] [facility]  [address] [place], [address]  [phone]    C/C: Medication management    HPI:    Sunil Lester is a 65 y.o. White (non-)  female who presents to clinic today for evaluation of the issues listed above. Pt is new to the practice     Previous pcp: [place]. [place] when she got       Subjective; Patient presenting for initial office visit with me today. Significant PMHx include: hypertension, diabetes (diet-controlled), ICH, hypothyroidism, anxiety/depression, asthma, Ménière's, GERD snd lumbar radiculopathy. Patient has multiple concerns. Has not been to her primary for more than a year and they would not refill her meds. Diabetes:  Dx in 2012. Was on metformin which she stopped taking after A1C was at goal.  Would like ozempic for 2nd weight loss benefit. Menieres? States that they thought she had meniere's disease but after extensive evaluation through ENT, it was ruled out. Had Ativan which helped. Anxiety:  Has severe anxiety and clautrophobic. Had xanax which she rarely takes    ICH:  On Fiorecet prn which she states is the only thing that helped. Hypothyroidism - diagnosed more than 10 years ago and was on synthroid but stopped taking due to hair loss. Back pain, chronic (RLE >LLE)  H/o lumbar radiculopathy for which she has been evaluated in the past including in the ER. Has seen back specialist in [place], [place]. Was had back MRI at that time and states that she was in process of getting another MRI through [facility] but it was never set up. Pt denies any  fever, chill, chest pain, SOB, abdominal pain, n/v/d, HA or dizziness. Has had colonoscopy done in th [place]. Has had multiple MRIs.     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

## 2023-06-20 LAB
25(OH)D3 SERPL-MCNC: 12.3 NG/ML (ref 30–100)
ALBUMIN SERPL-MCNC: 3.8 G/DL (ref 3.5–5)
ALBUMIN/GLOB SERPL: 1 (ref 1.1–2.2)
ALP SERPL-CCNC: 91 U/L (ref 45–117)
ALT SERPL-CCNC: 40 U/L (ref 12–78)
ANION GAP SERPL CALC-SCNC: 8 MMOL/L (ref 5–15)
AST SERPL-CCNC: 14 U/L (ref 15–37)
BILIRUB SERPL-MCNC: 0.3 MG/DL (ref 0.2–1)
BUN SERPL-MCNC: 16 MG/DL (ref 6–20)
BUN/CREAT SERPL: 19 (ref 12–20)
CALCIUM SERPL-MCNC: 9.5 MG/DL (ref 8.5–10.1)
CHLORIDE SERPL-SCNC: 107 MMOL/L (ref 97–108)
CHOLEST SERPL-MCNC: 307 MG/DL
CO2 SERPL-SCNC: 25 MMOL/L (ref 21–32)
CREAT SERPL-MCNC: 0.86 MG/DL (ref 0.55–1.02)
ERYTHROCYTE [DISTWIDTH] IN BLOOD BY AUTOMATED COUNT: 13.1 % (ref 11.5–14.5)
EST. AVERAGE GLUCOSE BLD GHB EST-MCNC: 103 MG/DL
GLOBULIN SER CALC-MCNC: 3.8 G/DL (ref 2–4)
GLUCOSE SERPL-MCNC: 102 MG/DL (ref 65–100)
HBA1C MFR BLD: 5.2 % (ref 4–5.6)
HCT VFR BLD AUTO: 40.6 % (ref 35–47)
HDLC SERPL-MCNC: 53 MG/DL
HDLC SERPL: 5.8 (ref 0–5)
HGB BLD-MCNC: 12.9 G/DL (ref 11.5–16)
LDLC SERPL CALC-MCNC: 219.6 MG/DL (ref 0–100)
MCH RBC QN AUTO: 29.7 PG (ref 26–34)
MCHC RBC AUTO-ENTMCNC: 31.8 G/DL (ref 30–36.5)
MCV RBC AUTO: 93.5 FL (ref 80–99)
NRBC # BLD: 0 K/UL (ref 0–0.01)
NRBC BLD-RTO: 0 PER 100 WBC
PLATELET # BLD AUTO: 314 K/UL (ref 150–400)
PMV BLD AUTO: 11.1 FL (ref 8.9–12.9)
POTASSIUM SERPL-SCNC: 4.5 MMOL/L (ref 3.5–5.1)
PROT SERPL-MCNC: 7.6 G/DL (ref 6.4–8.2)
RBC # BLD AUTO: 4.34 M/UL (ref 3.8–5.2)
SODIUM SERPL-SCNC: 140 MMOL/L (ref 136–145)
T4 FREE SERPL-MCNC: 0.9 NG/DL (ref 0.8–1.5)
TRIGL SERPL-MCNC: 172 MG/DL
TSH SERPL DL<=0.05 MIU/L-ACNC: 2.02 UIU/ML (ref 0.36–3.74)
VLDLC SERPL CALC-MCNC: 34.4 MG/DL
WBC # BLD AUTO: 8.2 K/UL (ref 3.6–11)

## 2023-06-21 DIAGNOSIS — E78.2 MIXED HYPERLIPIDEMIA: Primary | ICD-10-CM

## 2023-06-21 RX ORDER — ATORVASTATIN CALCIUM 80 MG/1
80 TABLET, FILM COATED ORAL DAILY
Qty: 90 TABLET | Refills: 1 | Status: SHIPPED | OUTPATIENT
Start: 2023-06-21

## 2023-06-22 ENCOUNTER — CLINICAL DOCUMENTATION (OUTPATIENT)
Age: 58
End: 2023-06-22

## 2023-06-23 ENCOUNTER — TRANSCRIBE ORDERS (OUTPATIENT)
Facility: HOSPITAL | Age: 58
End: 2023-06-23

## 2023-06-23 DIAGNOSIS — E55.9 VITAMIN D DEFICIENCY: Primary | ICD-10-CM

## 2023-06-23 DIAGNOSIS — M54.50 CHRONIC BILATERAL LOW BACK PAIN WITHOUT SCIATICA: ICD-10-CM

## 2023-06-23 DIAGNOSIS — G89.29 CHRONIC BILATERAL LOW BACK PAIN WITHOUT SCIATICA: ICD-10-CM

## 2023-06-23 DIAGNOSIS — M54.16 LUMBAR RADICULOPATHY: Primary | ICD-10-CM

## 2023-06-23 RX ORDER — ERGOCALCIFEROL 1.25 MG/1
50000 CAPSULE ORAL WEEKLY
Qty: 12 CAPSULE | Refills: 3 | Status: SHIPPED | OUTPATIENT
Start: 2023-06-23

## 2023-07-29 ENCOUNTER — APPOINTMENT (OUTPATIENT)
Facility: HOSPITAL | Age: 58
End: 2023-07-29
Attending: EMERGENCY MEDICINE
Payer: COMMERCIAL

## 2023-07-29 ENCOUNTER — HOSPITAL ENCOUNTER (EMERGENCY)
Facility: HOSPITAL | Age: 58
Discharge: HOME | End: 2023-07-29
Attending: EMERGENCY MEDICINE
Payer: COMMERCIAL

## 2023-07-29 VITALS
BODY MASS INDEX: 34.86 KG/M2 | HEIGHT: 68 IN | OXYGEN SATURATION: 99 % | TEMPERATURE: 98.1 F | HEART RATE: 88 BPM | DIASTOLIC BLOOD PRESSURE: 97 MMHG | RESPIRATION RATE: 16 BRPM | SYSTOLIC BLOOD PRESSURE: 129 MMHG | WEIGHT: 230 LBS

## 2023-07-29 DIAGNOSIS — S90.30XA CONTUSION OF DORSUM OF FOOT: Primary | ICD-10-CM

## 2023-07-29 PROCEDURE — 99283 EMERGENCY DEPT VISIT LOW MDM: CPT

## 2023-07-29 PROCEDURE — 73630 X-RAY EXAM OF FOOT: CPT

## 2023-07-29 PROCEDURE — 6370000000 HC RX 637 (ALT 250 FOR IP): Performed by: EMERGENCY MEDICINE

## 2023-07-29 RX ORDER — KETOROLAC TROMETHAMINE 10 MG/1
10 TABLET, FILM COATED ORAL ONCE
Status: COMPLETED | OUTPATIENT
Start: 2023-07-29 | End: 2023-07-29

## 2023-07-29 RX ORDER — IBUPROFEN 600 MG/1
600 TABLET ORAL 4 TIMES DAILY PRN
Qty: 20 TABLET | Refills: 0 | Status: SHIPPED | OUTPATIENT
Start: 2023-07-29 | End: 2023-08-03

## 2023-07-29 RX ADMIN — KETOROLAC TROMETHAMINE 10 MG: 10 TABLET, FILM COATED ORAL at 11:51

## 2023-07-29 ASSESSMENT — LIFESTYLE VARIABLES
HOW MANY STANDARD DRINKS CONTAINING ALCOHOL DO YOU HAVE ON A TYPICAL DAY: PATIENT DOES NOT DRINK
HOW OFTEN DO YOU HAVE A DRINK CONTAINING ALCOHOL: NEVER

## 2023-07-29 ASSESSMENT — PAIN - FUNCTIONAL ASSESSMENT: PAIN_FUNCTIONAL_ASSESSMENT: 0-10

## 2023-07-29 ASSESSMENT — PAIN SCALES - GENERAL: PAINLEVEL_OUTOF10: 7

## 2023-07-29 ASSESSMENT — PAIN DESCRIPTION - LOCATION: LOCATION: FOOT

## 2023-07-29 NOTE — ED TRIAGE NOTES
Pt reports dropping dryer door onto right foot last night with 7/10 pain. Bruising noted to area. Pt states she also has ongoing pain from left knee to foot for over a month.

## 2023-08-23 ENCOUNTER — NURSE TRIAGE (OUTPATIENT)
Dept: OTHER | Facility: CLINIC | Age: 58
End: 2023-08-23

## 2023-08-23 NOTE — TELEPHONE ENCOUNTER
[place]    Received call from [place] at [facility] with [employer]. Subjective: Caller states \"I've had the symptoms off and on. They were were really bad in May and then slacked up. They have been back for about 3 weeks. It's impossible to sleep. Its' my left knee to my ankle. I've got some pain there. My doctor put me on Gabapentin years ago for restless leg syndrome. I don't call this restless leg syndrome. This pain is different. I have severe pain from my left knee to my ankle and just in the right knee. I didn't know if he could give an order for an xray. I'm up all night. It wakes me up. \"     Current Symptoms: intermittent weekly migraines-mild now, anxiety attacks-NOT debilitating, but getting worse, back pain, left leg pain from knee to ankle, right knee pain, NO swelling or discoloration, sciatic pain, right foot bruising/swelling (from previous injury/ED visit-7/29/23)    Hx of Pseudotumor Cerebri, Migraines, Anxiety/Depressive Disorder-NOT taking medication, OCD/ADHD    No hx of blood clot, but did have R/O testing    NO recent injury    Onset: 3 weeks ago; worsening    Associated Symptoms: reduced activity, increased wakefulness    Pain Severity: 8/10; throbbing; constant    Temperature: Denies    What has been tried: Gabapentin    LMP: NA Pregnant: NA    Recommended disposition: Go to ED/UCC Now (Or to Office with PCP Approval). Writer unable to reach [facility] x multiple attempts. Patient agreeable and will have someone drive her there. Care advice provided, patient verbalizes understanding; denies any other questions or concerns; instructed to call back for any new or worsening symptoms. Patient/caller agrees to proceed to [facility] [place] [facility]. Attention Provider: Thank you for allowing me to participate in the care of your patient. The patient was connected to triage in response to information provided to the ECC/PSC.   Please do not

## 2023-09-19 ENCOUNTER — OFFICE VISIT (OUTPATIENT)
Age: 58
End: 2023-09-19
Payer: COMMERCIAL

## 2023-09-19 VITALS
TEMPERATURE: 98 F | WEIGHT: 240 LBS | RESPIRATION RATE: 17 BRPM | HEIGHT: 68 IN | OXYGEN SATURATION: 96 % | BODY MASS INDEX: 36.37 KG/M2 | DIASTOLIC BLOOD PRESSURE: 85 MMHG | SYSTOLIC BLOOD PRESSURE: 119 MMHG | HEART RATE: 91 BPM

## 2023-09-19 DIAGNOSIS — G89.29 CHRONIC PAIN OF LEFT KNEE: Primary | ICD-10-CM

## 2023-09-19 DIAGNOSIS — M54.16 LUMBAR RADICULOPATHY: ICD-10-CM

## 2023-09-19 DIAGNOSIS — G93.2 BENIGN INTRACRANIAL HYPERTENSION: ICD-10-CM

## 2023-09-19 DIAGNOSIS — M25.562 CHRONIC PAIN OF LEFT KNEE: Primary | ICD-10-CM

## 2023-09-19 PROCEDURE — 99214 OFFICE O/P EST MOD 30 MIN: CPT | Performed by: STUDENT IN AN ORGANIZED HEALTH CARE EDUCATION/TRAINING PROGRAM

## 2023-09-19 PROCEDURE — 3074F SYST BP LT 130 MM HG: CPT | Performed by: STUDENT IN AN ORGANIZED HEALTH CARE EDUCATION/TRAINING PROGRAM

## 2023-09-19 PROCEDURE — 3079F DIAST BP 80-89 MM HG: CPT | Performed by: STUDENT IN AN ORGANIZED HEALTH CARE EDUCATION/TRAINING PROGRAM

## 2023-09-19 RX ORDER — BUTALBITAL, ACETAMINOPHEN AND CAFFEINE 50; 325; 40 MG/1; MG/1; MG/1
TABLET ORAL
Qty: 90 TABLET | Refills: 1 | Status: SHIPPED | OUTPATIENT
Start: 2023-09-19

## 2023-09-19 RX ORDER — GABAPENTIN 100 MG/1
100 CAPSULE ORAL 2 TIMES DAILY
Qty: 180 CAPSULE | Refills: 1
Start: 2023-09-19 | End: 2024-03-17

## 2023-09-19 NOTE — PROGRESS NOTES
Name and Date of Birth Verified    Pharmacy Verified    Chief Complaint   Patient presents with    Follow-up     6/19/2023 Lumbar radiculopathy      Not fasting [facility]. 1. \"Have you been to the ER, urgent care clinic since your last visit? Hospitalized since your last visit? \"     Yes 7/29/2023  [facility] EMERGENCY DEPT  Contusion of dorsum of foot        2. \"Have you seen or consulted any other health care providers outside of the [address] [place] [address] since your last visit? \" No     3. For patients aged 43-73: Has the patient had a colonoscopy / FIT/ Cologuard? Yes Due: 7/10/2028      If the patient is female:    4. For patients aged 43-66: Has the patient had a mammogram within the past 2 years? No      5. For patients aged 21-65: Has the patient had a pap smear?  No     Health Maintenance Due   Topic Date Due    Hepatitis B vaccine (1 of 3 - 3-dose series) Never done    Diabetic foot exam  Never done    HIV screen  Never done    Diabetic Alb to Cr ratio (uACR) test  Never done    Diabetic retinal exam  Never done    Hepatitis C screen  Never done    Cervical cancer screen  Never done    Pneumococcal 0-64 years Vaccine (2 - PCV) 07/10/2016    Breast cancer screen  Never done    Shingles vaccine (1 of 2) Never done    Flu vaccine (1) 08/01/2023
well. No crepitation. Mild Medial and lateral joint line tenderness of the left knee. No ligamentous instability. Assessment/Plan      Diagnosis Orders   1. Chronic pain of left knee  XR KNEE LEFT (3 VIEWS)    [facility] - Flaquita Traylor MD, Orthopedic Surgery (knee), [place]      2. Lumbar radiculopathy  gabapentin (NEURONTIN) 100 MG capsule      3. Benign intracranial hypertension  butalbital-acetaminophen-caffeine (FIORICET, ESGIC) -40 MG per tablet        1. Chronic pain of left knee  We did discuss that we could continue to seek out nonoperative modalities, such as: NSAIDs, oral and topical analgesics, joint injections, physical therapy, stretching, strengthening, and activity modification. The patient stated their understanding with this and would like to proceed with nonsurgical management;  - continue NSAIDs prn  - XR KNEE LEFT (3 VIEWS); Future  - Jonah Clifford MD, Orthopedic Surgery (knee)[facility]    2. Lumbar radiculopathy  We did discuss that we could continue to seek out nonoperative modalities, such as: NSAIDs, oral and topical analgesics, joint injections, physical therapy, stretching, strengthening, and activity modification. The patient stated their understanding with this and would like to proceed with nonsurgical management;    - gabapentin (NEURONTIN) 100 MG capsule; Take 1 capsule by mouth 2 times daily for 180 days. Intended supply: 90 days Max Daily Amount: 200 mg  Dispense: 180 capsule; Refill: 1    3. Benign intracranial hypertension  - butalbital-acetaminophen-caffeine (FIORICET, ESGIC) -40 MG per tablet; TAKE 1 TABLET EVERY 8 HOURS AS NEEDED FOR PAIN  Dispense: 90 tablet; Refill: 1      Follow up: 3-6 months. RTC to clinic sooner should symptoms persist, worsen or fail to improve as anticipated. We discussed the expected course, resolution and complications of the diagnosis(es) in detail.   Medication risks, benefits, costs, interactions, and

## 2023-10-30 ENCOUNTER — TELEPHONE (OUTPATIENT)
Age: 58
End: 2023-10-30

## 2023-10-30 DIAGNOSIS — M54.16 LUMBAR RADICULOPATHY: ICD-10-CM

## 2023-10-30 NOTE — TELEPHONE ENCOUNTER
Pt p# [phone], pt states that Gabapentin 100mg on 9/19 never went through electronically , please re send -0026.

## 2023-10-31 DIAGNOSIS — M54.16 LUMBAR RADICULOPATHY: ICD-10-CM

## 2023-10-31 RX ORDER — GABAPENTIN 100 MG/1
100 CAPSULE ORAL 2 TIMES DAILY
Qty: 60 CAPSULE | Refills: 0 | Status: SHIPPED | OUTPATIENT
Start: 2023-10-31 | End: 2023-11-30

## 2023-11-01 RX ORDER — METHOCARBAMOL 750 MG/1
750 TABLET, FILM COATED ORAL
Qty: 30 TABLET | Refills: 0 | Status: SHIPPED | OUTPATIENT
Start: 2023-11-01

## 2023-11-01 NOTE — TELEPHONE ENCOUNTER
Last appointment: 9/19/23  Next appointment: Emily Harman to follow-up 3-6 months  Previous refill encounter(s): 6/19/23 #30 with 2 refills    Requested Prescriptions     Pending Prescriptions Disp Refills    methocarbamol (ROBAXIN) 750 MG tablet [Pharmacy Med Name: METHOCARBAMOL 750 MG TABLET] 30 tablet 0     Sig: TAKE 1 TABLET BY MOUTH EVERY DAY AT [facility] Only    Program: Medication Refill  CPA in place:    Recommendation Provided To:    Intervention Detail: New Rx: 1, reason: Patient Preference  Intervention Accepted By:   Shubham Lozano Closed?:    Time Spent (min): 5

## 2023-11-30 DIAGNOSIS — M54.16 LUMBAR RADICULOPATHY: ICD-10-CM

## 2023-11-30 NOTE — TELEPHONE ENCOUNTER
Last appointment: 9/19/23  Next appointment: 3/19/24  Previous refill encounter(s): 11/1/23 #30    Requested Prescriptions     Pending Prescriptions Disp Refills    methocarbamol (ROBAXIN) 750 MG tablet [Pharmacy Med Name: METHOCARBAMOL 750 MG TABLET] 90 tablet 1     Sig: TAKE 1 TABLET BY MOUTH EVERY DAY AT [address] [place] [address] Only    Program: Medication Refill  CPA in place:    Recommendation Provided To:    Intervention Detail: New Rx: 1, reason: Patient Preference  Intervention Accepted By:   Kwabena Ness Closed?:    Time Spent (min): 5

## 2023-12-01 RX ORDER — METHOCARBAMOL 750 MG/1
750 TABLET, FILM COATED ORAL
Qty: 90 TABLET | Refills: 1 | Status: SHIPPED | OUTPATIENT
Start: 2023-12-01

## 2024-01-08 ENCOUNTER — HOSPITAL ENCOUNTER (OUTPATIENT)
Facility: HOSPITAL | Age: 59
Discharge: HOME | End: 2024-01-11
Attending: STUDENT IN AN ORGANIZED HEALTH CARE EDUCATION/TRAINING PROGRAM
Payer: COMMERCIAL

## 2024-01-08 DIAGNOSIS — M25.562 CHRONIC PAIN OF LEFT KNEE: ICD-10-CM

## 2024-01-08 DIAGNOSIS — G89.29 CHRONIC PAIN OF LEFT KNEE: ICD-10-CM

## 2024-01-08 PROCEDURE — 73562 X-RAY EXAM OF KNEE 3: CPT

## 2024-02-01 ENCOUNTER — TELEMEDICINE (OUTPATIENT)
Age: 59
End: 2024-02-01
Payer: COMMERCIAL

## 2024-02-01 DIAGNOSIS — A08.4 VIRAL GASTROENTERITIS: Primary | ICD-10-CM

## 2024-02-01 PROCEDURE — 99213 OFFICE O/P EST LOW 20 MIN: CPT | Performed by: STUDENT IN AN ORGANIZED HEALTH CARE EDUCATION/TRAINING PROGRAM

## 2024-02-01 RX ORDER — DICYCLOMINE HYDROCHLORIDE 10 MG/1
CAPSULE ORAL
Qty: 30 CAPSULE | Refills: 0 | Status: SHIPPED | OUTPATIENT
Start: 2024-02-01

## 2024-02-01 RX ORDER — ONDANSETRON 4 MG/1
4 TABLET, ORALLY DISINTEGRATING ORAL 3 TIMES DAILY PRN
Qty: 21 TABLET | Refills: 0 | Status: SHIPPED | OUTPATIENT
Start: 2024-02-01 | End: 2024-02-04

## 2024-02-01 NOTE — PROGRESS NOTES
[facility]  [address] [place] [place] [address] [phone]    Chief Complaint: Gastroenteritis     Subjective  Isabelle Dimas is a 58 y.o. White (non-) female , established patient, here for evaluation of the concern(s) above;    SUBJECTIVE:     Pt would like to be evaluated for the problem(s) listed above:     Acute Gastroenteritis:  Patient complains of abdominal cramps, nausea and watery diarrhea x 2 days. States that she did not have any food the night prior. Had a mild fever a day ago which is now resolved.   Denies any bloody diarrhea or hematemesis.  Patient has been rehydrating well at home and endorse being well hydrated. Patient tolerating fluid intake, but appetite remains low. No cough, fever, chills, chest pain or sob.      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  from 1st  who lives in [place]. Has 2 sons and a daughter  Review of systems:       A comprehensive review of systems was negative except for that written in the History of Present Illness.         PHYSICAL EXAM:     General: alert, cooperative, no distress   Mental  status: mental status: alert, oriented to person, place, and time, normal mood, behavior, speech, dress, motor activity, and thought processes   Resp: resp: normal effort and no respiratory distress   Neuro: neuro: no gross deficits   Skin: skin: no discoloration or lesions of concern on visible areas   Due to this being a TeleHealth evaluation, many elements of the physical examination are unable to be assessed.        ASSESSMENT/PLAN:      Diagnosis Orders   1. Viral gastroenteritis  dicyclomine (BENTYL) 10 MG capsule    ondansetron (ZOFRAN-ODT) 4 MG disintegrating tablet        1. Viral gastroenteritis   The fact that patient can keep food down and

## 2024-02-01 NOTE — PROGRESS NOTES
Chief Complaint   Patient presents with    Follow-up     2023 Shin Left knee    Fever     X 2 days     Having extreme lower abdominal pain, nausea, denies vomiting. Diarrhea real watery. Pain scale 9 out 10.    \"Have you been to the ER, urgent care clinic since your last visit?  Hospitalized since your last visit?\"    NO    “Have you seen or consulted any other health care providers outside of [facility] since your last visit?”    NO        There were no vitals filed for this visit.  Health Maintenance Due   Topic Date Due    Hepatitis B vaccine (1 of 3 - 3-dose series) Never done    COVID-19 Vaccine (1) Never done    Diabetic foot exam  Never done    HIV screen  Never done    Diabetic Alb to Cr ratio (uACR) test  Never done    Diabetic retinal exam  Never done    Hepatitis C screen  Never done    Cervical cancer screen  Never done    Shingles vaccine (1 of 2) Never done    Breast cancer screen  03/15/2021      The patient, Isabelle Dimas, identity was verified by name and , pharmacy verified  Labs:N/A  Fasting:N/A

## 2024-02-04 ENCOUNTER — HOSPITAL ENCOUNTER (EMERGENCY)
Facility: HOSPITAL | Age: 59
Discharge: HOME | End: 2024-02-04
Attending: EMERGENCY MEDICINE
Payer: COMMERCIAL

## 2024-02-04 ENCOUNTER — APPOINTMENT (OUTPATIENT)
Facility: HOSPITAL | Age: 59
End: 2024-02-04
Payer: COMMERCIAL

## 2024-02-04 VITALS
WEIGHT: 237.44 LBS | HEART RATE: 103 BPM | SYSTOLIC BLOOD PRESSURE: 145 MMHG | TEMPERATURE: 97.3 F | BODY MASS INDEX: 36.1 KG/M2 | DIASTOLIC BLOOD PRESSURE: 99 MMHG | OXYGEN SATURATION: 100 % | RESPIRATION RATE: 20 BRPM

## 2024-02-04 DIAGNOSIS — R11.2 NAUSEA VOMITING AND DIARRHEA: ICD-10-CM

## 2024-02-04 DIAGNOSIS — R10.33 ACUTE PERIUMBILICAL PAIN: ICD-10-CM

## 2024-02-04 DIAGNOSIS — R19.7 NAUSEA VOMITING AND DIARRHEA: ICD-10-CM

## 2024-02-04 DIAGNOSIS — K57.32 DIVERTICULITIS OF COLON: Primary | ICD-10-CM

## 2024-02-04 LAB
ALBUMIN SERPL-MCNC: 3.6 G/DL (ref 3.5–5)
ALBUMIN/GLOB SERPL: 0.8 (ref 1.1–2.2)
ALP SERPL-CCNC: 95 U/L (ref 45–117)
ALT SERPL-CCNC: 35 U/L (ref 12–78)
ANION GAP SERPL CALC-SCNC: 4 MMOL/L (ref 5–15)
APPEARANCE UR: CLEAR
AST SERPL-CCNC: 15 U/L (ref 15–37)
BASOPHILS # BLD: 0.1 K/UL (ref 0–0.1)
BASOPHILS NFR BLD: 1 % (ref 0–1)
BILIRUB SERPL-MCNC: 0.3 MG/DL (ref 0.2–1)
BILIRUB UR QL: NEGATIVE
BUN SERPL-MCNC: 9 MG/DL (ref 6–20)
BUN/CREAT SERPL: 10 (ref 12–20)
CALCIUM SERPL-MCNC: 9 MG/DL (ref 8.5–10.1)
CHLORIDE SERPL-SCNC: 109 MMOL/L (ref 97–108)
CO2 SERPL-SCNC: 28 MMOL/L (ref 21–32)
COLOR UR: NORMAL
CREAT SERPL-MCNC: 0.94 MG/DL (ref 0.55–1.02)
DIFFERENTIAL METHOD BLD: ABNORMAL
EOSINOPHIL # BLD: 0.1 K/UL (ref 0–0.4)
EOSINOPHIL NFR BLD: 1 % (ref 0–7)
ERYTHROCYTE [DISTWIDTH] IN BLOOD BY AUTOMATED COUNT: 12.8 % (ref 11.5–14.5)
GLOBULIN SER CALC-MCNC: 4.5 G/DL (ref 2–4)
GLUCOSE SERPL-MCNC: 94 MG/DL (ref 65–100)
GLUCOSE UR STRIP.AUTO-MCNC: NEGATIVE MG/DL
HCT VFR BLD AUTO: 40.1 % (ref 35–47)
HGB BLD-MCNC: 12.9 G/DL (ref 11.5–16)
HGB UR QL STRIP: NEGATIVE
IMM GRANULOCYTES # BLD AUTO: 0 K/UL (ref 0–0.04)
IMM GRANULOCYTES NFR BLD AUTO: 0 % (ref 0–0.5)
KETONES UR QL STRIP.AUTO: NEGATIVE MG/DL
LEUKOCYTE ESTERASE UR QL STRIP.AUTO: NEGATIVE
LIPASE SERPL-CCNC: 18 U/L (ref 13–75)
LYMPHOCYTES # BLD: 2.6 K/UL (ref 0.8–3.5)
LYMPHOCYTES NFR BLD: 39 % (ref 12–49)
MCH RBC QN AUTO: 29.3 PG (ref 26–34)
MCHC RBC AUTO-ENTMCNC: 32.2 G/DL (ref 30–36.5)
MCV RBC AUTO: 90.9 FL (ref 80–99)
MONOCYTES # BLD: 0.3 K/UL (ref 0–1)
MONOCYTES NFR BLD: 4 % (ref 5–13)
NEUTS SEG # BLD: 3.5 K/UL (ref 1.8–8)
NEUTS SEG NFR BLD: 55 % (ref 32–75)
NITRITE UR QL STRIP.AUTO: NEGATIVE
NRBC # BLD: 0 K/UL (ref 0–0.01)
NRBC BLD-RTO: 0 PER 100 WBC
PH UR STRIP: 6 (ref 5–8)
PLATELET # BLD AUTO: 335 K/UL (ref 150–400)
PMV BLD AUTO: 10.1 FL (ref 8.9–12.9)
POTASSIUM SERPL-SCNC: 3.4 MMOL/L (ref 3.5–5.1)
PROT SERPL-MCNC: 8.1 G/DL (ref 6.4–8.2)
PROT UR STRIP-MCNC: NEGATIVE MG/DL
RBC # BLD AUTO: 4.41 M/UL (ref 3.8–5.2)
RBC MORPH BLD: ABNORMAL
SODIUM SERPL-SCNC: 141 MMOL/L (ref 136–145)
SP GR UR REFRACTOMETRY: 1.01
UROBILINOGEN UR QL STRIP.AUTO: 0.2 EU/DL (ref 0.2–1)
WBC # BLD AUTO: 6.6 K/UL (ref 3.6–11)
WBC MORPH BLD: ABNORMAL

## 2024-02-04 PROCEDURE — 81002 URINALYSIS NONAUTO W/O SCOPE: CPT

## 2024-02-04 PROCEDURE — 96375 TX/PRO/DX INJ NEW DRUG ADDON: CPT

## 2024-02-04 PROCEDURE — 99285 EMERGENCY DEPT VISIT HI MDM: CPT

## 2024-02-04 PROCEDURE — 6360000004 HC RX CONTRAST MEDICATION: Performed by: EMERGENCY MEDICINE

## 2024-02-04 PROCEDURE — 83690 ASSAY OF LIPASE: CPT

## 2024-02-04 PROCEDURE — 93005 ELECTROCARDIOGRAM TRACING: CPT | Performed by: EMERGENCY MEDICINE

## 2024-02-04 PROCEDURE — 36415 COLL VENOUS BLD VENIPUNCTURE: CPT

## 2024-02-04 PROCEDURE — 80053 COMPREHEN METABOLIC PANEL: CPT

## 2024-02-04 PROCEDURE — 2580000003 HC RX 258: Performed by: EMERGENCY MEDICINE

## 2024-02-04 PROCEDURE — 6360000002 HC RX W HCPCS: Performed by: EMERGENCY MEDICINE

## 2024-02-04 PROCEDURE — 96374 THER/PROPH/DIAG INJ IV PUSH: CPT

## 2024-02-04 PROCEDURE — 85025 COMPLETE CBC W/AUTO DIFF WBC: CPT

## 2024-02-04 PROCEDURE — 96361 HYDRATE IV INFUSION ADD-ON: CPT

## 2024-02-04 PROCEDURE — 74177 CT ABD & PELVIS W/CONTRAST: CPT

## 2024-02-04 RX ORDER — AMOXICILLIN AND CLAVULANATE POTASSIUM 875; 125 MG/1; MG/1
1 TABLET, FILM COATED ORAL 2 TIMES DAILY
Qty: 14 TABLET | Refills: 0 | Status: SHIPPED | OUTPATIENT
Start: 2024-02-04 | End: 2024-02-11

## 2024-02-04 RX ORDER — ONDANSETRON 2 MG/ML
4 INJECTION INTRAMUSCULAR; INTRAVENOUS ONCE
Status: COMPLETED | OUTPATIENT
Start: 2024-02-04 | End: 2024-02-04

## 2024-02-04 RX ORDER — FENTANYL CITRATE 50 UG/ML
50 INJECTION, SOLUTION INTRAMUSCULAR; INTRAVENOUS
Status: DISCONTINUED | OUTPATIENT
Start: 2024-02-04 | End: 2024-02-04 | Stop reason: HOSPADM

## 2024-02-04 RX ORDER — ONDANSETRON 4 MG/1
4 TABLET, ORALLY DISINTEGRATING ORAL EVERY 8 HOURS PRN
Qty: 20 TABLET | Refills: 0 | Status: SHIPPED | OUTPATIENT
Start: 2024-02-04

## 2024-02-04 RX ORDER — KETOROLAC TROMETHAMINE 30 MG/ML
15 INJECTION, SOLUTION INTRAMUSCULAR; INTRAVENOUS ONCE
Status: COMPLETED | OUTPATIENT
Start: 2024-02-04 | End: 2024-02-04

## 2024-02-04 RX ORDER — 0.9 % SODIUM CHLORIDE 0.9 %
1000 INTRAVENOUS SOLUTION INTRAVENOUS ONCE
Status: COMPLETED | OUTPATIENT
Start: 2024-02-04 | End: 2024-02-04

## 2024-02-04 RX ADMIN — SODIUM CHLORIDE 1000 ML: 9 INJECTION, SOLUTION INTRAVENOUS at 17:05

## 2024-02-04 RX ADMIN — KETOROLAC TROMETHAMINE 15 MG: 30 INJECTION, SOLUTION INTRAMUSCULAR; INTRAVENOUS at 17:07

## 2024-02-04 RX ADMIN — IOPAMIDOL 100 ML: 755 INJECTION, SOLUTION INTRAVENOUS at 17:36

## 2024-02-04 RX ADMIN — ONDANSETRON 4 MG: 2 INJECTION INTRAMUSCULAR; INTRAVENOUS at 17:06

## 2024-02-04 ASSESSMENT — PAIN SCALES - GENERAL: PAINLEVEL_OUTOF10: 10

## 2024-02-04 NOTE — DISCHARGE INSTRUCTIONS
You were evaluated in the emergency department for abdominal pain with nausea, vomiting, and diarrhea.  Your examination was reassuring as was your work-up including blood work and CT scan which shows diverticulitis.  It will be important for you to follow-up with your primary care physician in 2-3 days. Please take your antibiotics as prescribed until completion.  If you develop worsening symptoms such as worsening abdominal pain, vomiting, or fevers, please return to the emergency department immediately.

## 2024-02-05 LAB
EKG ATRIAL RATE: 92 BPM
EKG DIAGNOSIS: NORMAL
EKG P AXIS: 42 DEGREES
EKG P-R INTERVAL: 116 MS
EKG Q-T INTERVAL: 360 MS
EKG QRS DURATION: 80 MS
EKG QTC CALCULATION (BAZETT): 445 MS
EKG R AXIS: -4 DEGREES
EKG T AXIS: 37 DEGREES
EKG VENTRICULAR RATE: 92 BPM

## 2024-02-09 ENCOUNTER — TELEPHONE (OUTPATIENT)
Age: 59
End: 2024-02-09

## 2024-02-09 NOTE — TELEPHONE ENCOUNTER
Pt p#[phone], pt requesting  to call her she is still vomiting and having abdominal pain after even having soup and diahrea so bad she can't go anywhere.  ER diagnosied Divaticalitus yesterday and recommended a gastro dr .   What is she supposed to do for right now/ requesting advice

## 2024-02-13 ENCOUNTER — TELEPHONE (OUTPATIENT)
Age: 59
End: 2024-02-13

## 2024-02-13 NOTE — TELEPHONE ENCOUNTER
Offered patient to have Virtual ED F/U with Dr. Tyler to discuss plan of action. Patient has appt. scheduled for 2/29/2024 but will do earlier visit for F/U.Patient to return call to office.

## 2024-02-29 ENCOUNTER — OFFICE VISIT (OUTPATIENT)
Age: 59
End: 2024-02-29
Payer: COMMERCIAL

## 2024-02-29 VITALS
OXYGEN SATURATION: 97 % | WEIGHT: 237.44 LBS | SYSTOLIC BLOOD PRESSURE: 151 MMHG | TEMPERATURE: 97.1 F | HEART RATE: 64 BPM | HEIGHT: 68 IN | DIASTOLIC BLOOD PRESSURE: 96 MMHG | BODY MASS INDEX: 35.99 KG/M2 | RESPIRATION RATE: 17 BRPM

## 2024-02-29 DIAGNOSIS — J20.9 ACUTE BRONCHITIS, UNSPECIFIED ORGANISM: ICD-10-CM

## 2024-02-29 DIAGNOSIS — I10 PRIMARY HYPERTENSION: ICD-10-CM

## 2024-02-29 DIAGNOSIS — E11.9 TYPE 2 DIABETES MELLITUS WITHOUT COMPLICATION, WITHOUT LONG-TERM CURRENT USE OF INSULIN (HCC): Primary | ICD-10-CM

## 2024-02-29 DIAGNOSIS — G89.29 CHRONIC ABDOMINAL PAIN: ICD-10-CM

## 2024-02-29 DIAGNOSIS — R10.9 CHRONIC ABDOMINAL PAIN: ICD-10-CM

## 2024-02-29 PROCEDURE — 3080F DIAST BP >= 90 MM HG: CPT | Performed by: STUDENT IN AN ORGANIZED HEALTH CARE EDUCATION/TRAINING PROGRAM

## 2024-02-29 PROCEDURE — 3077F SYST BP >= 140 MM HG: CPT | Performed by: STUDENT IN AN ORGANIZED HEALTH CARE EDUCATION/TRAINING PROGRAM

## 2024-02-29 PROCEDURE — 99214 OFFICE O/P EST MOD 30 MIN: CPT | Performed by: STUDENT IN AN ORGANIZED HEALTH CARE EDUCATION/TRAINING PROGRAM

## 2024-02-29 RX ORDER — LOSARTAN POTASSIUM 100 MG/1
100 TABLET ORAL DAILY
Qty: 90 TABLET | Refills: 1 | Status: SHIPPED | OUTPATIENT
Start: 2024-02-29

## 2024-02-29 RX ORDER — PREDNISONE 20 MG/1
20 TABLET ORAL DAILY
Qty: 10 TABLET | Refills: 0 | Status: SHIPPED | OUTPATIENT
Start: 2024-02-29 | End: 2024-03-10

## 2024-02-29 RX ORDER — DOXYCYCLINE HYCLATE 100 MG
100 TABLET ORAL 2 TIMES DAILY
Qty: 20 TABLET | Refills: 0 | Status: SHIPPED | OUTPATIENT
Start: 2024-02-29 | End: 2024-03-10

## 2024-02-29 ASSESSMENT — PATIENT HEALTH QUESTIONNAIRE - PHQ9
7. TROUBLE CONCENTRATING ON THINGS, SUCH AS READING THE NEWSPAPER OR WATCHING TELEVISION: 0
2. FEELING DOWN, DEPRESSED OR HOPELESS: 0
9. THOUGHTS THAT YOU WOULD BE BETTER OFF DEAD, OR OF HURTING YOURSELF: 0
SUM OF ALL RESPONSES TO PHQ QUESTIONS 1-9: 0
1. LITTLE INTEREST OR PLEASURE IN DOING THINGS: 0
6. FEELING BAD ABOUT YOURSELF - OR THAT YOU ARE A FAILURE OR HAVE LET YOURSELF OR YOUR FAMILY DOWN: 0
SUM OF ALL RESPONSES TO PHQ QUESTIONS 1-9: 0
3. TROUBLE FALLING OR STAYING ASLEEP: 0
SUM OF ALL RESPONSES TO PHQ9 QUESTIONS 1 & 2: 0
SUM OF ALL RESPONSES TO PHQ QUESTIONS 1-9: 0
10. IF YOU CHECKED OFF ANY PROBLEMS, HOW DIFFICULT HAVE THESE PROBLEMS MADE IT FOR YOU TO DO YOUR WORK, TAKE CARE OF THINGS AT HOME, OR GET ALONG WITH OTHER PEOPLE: 0
5. POOR APPETITE OR OVEREATING: 0
SUM OF ALL RESPONSES TO PHQ QUESTIONS 1-9: 0
8. MOVING OR SPEAKING SO SLOWLY THAT OTHER PEOPLE COULD HAVE NOTICED. OR THE OPPOSITE, BEING SO FIGETY OR RESTLESS THAT YOU HAVE BEEN MOVING AROUND A LOT MORE THAN USUAL: 0
4. FEELING TIRED OR HAVING LITTLE ENERGY: 0

## 2024-02-29 NOTE — PROGRESS NOTES
Chief Complaint   Patient presents with    Follow-up     2024 Viral gastroenteritis    Letter for School/Work     Needs dates of returning to work on the 2024     \"Have you been to the ER, urgent care clinic since your last visit?  Hospitalized since your last visit?\"      Yes  2024 (3 hours)  [facility] EMERGENCY DEPT  Diverticulitis of colon     “Have you seen or consulted any other health care providers outside of [facility] since your last visit?”    NO       Have you had a mammogram?”   NO Declined at this time     “Have you had a pap smear?”    NO           Vitals:    24 1606   BP: (!) 151/96   Pulse: 64   Resp: 17   Temp: 97.1 °F (36.2 °C)   SpO2: 97%     Health Maintenance Due   Topic Date Due    Hepatitis B vaccine (1 of 3 - 3-dose series) Never done    COVID-19 Vaccine (1) Never done    Diabetic foot exam  Never done    HIV screen  Never done    Diabetic Alb to Cr ratio (uACR) test  Never done    Diabetic retinal exam  Never done    Hepatitis C screen  Never done    Cervical cancer screen  Never done    Shingles vaccine (1 of 2) Never done    Breast cancer screen  03/15/2021      The patient, Isabelle Dimas, identity was verified by name and , pharmacy verified  Labs:Yes  Fasting:No

## 2024-02-29 NOTE — PROGRESS NOTES
[facility]  [address] [place] [address] [phone]    Chief Complaint: chronic medical problems    Subjective  Isabelle Dimas is a 58 y.o. White (non-) female , established patient, here for evaluation of the concern(s) above;    PMHx: Diverticulitis, Well controlled diabetes, GERD, asthma HTN here for follow up;      Asthma have worsened over the past 2 weeks after recent cold. Has been using inhaler more than usual    DM:    Diagnosed several years ago.  Was on Metformin in past.  A1C has been well controlled.    HTN:  On losartan 50mg daily.    Chronic abdominal pain:  Pt had colonoscopy in [place] about 2-3 years ago and was told she has diverticulosis.  Has had recent diverticular flares for which she has been to the ER.  Has also had worsening reflux refractory to acid suppressants.     Pertinent negatives include  no chest pain, no abdominal pain and no shortness of breath.     Other Health Habits and social history:  Occupation:  at "[employer] (7th through 12th grade)  Marital status: Patient is currently  from 2nd  who is in [place].  from 1st  who lives in [place]. Has 2 sons and a daughter       Allergies - reviewed:   Allergies   Allergen Reactions    Statins Headaches    Acetaminophen-Codeine Itching     Reports can take Tylenol Just codeine causes itching    Erythromycin Base Nausea And Vomiting       Past Medical History - reviewed:  Past Medical History:   Diagnosis Date    Asthma     Diabetes (HCC)     GERD (gastroesophageal reflux disease)     Hypertension     Other ill-defined conditions(799.89)     meinere's disease       Depression screening:  PHQ-9 Total Score: 0 (2/29/2024  4:11 PM)  Thoughts that you would be better off dead, or of hurting yourself in some way: 0 (2/29/2024  4:11 PM)      Review of systems:   A comprehensive review of systems was negative except for that written

## 2024-03-12 ENCOUNTER — CLINICAL DOCUMENTATION (OUTPATIENT)
Age: 59
End: 2024-03-12

## 2024-03-12 DIAGNOSIS — A08.4 VIRAL GASTROENTERITIS: ICD-10-CM

## 2024-03-13 RX ORDER — DICYCLOMINE HYDROCHLORIDE 10 MG/1
CAPSULE ORAL
Qty: 30 CAPSULE | Refills: 0 | Status: SHIPPED | OUTPATIENT
Start: 2024-03-13

## 2024-03-13 NOTE — TELEPHONE ENCOUNTER
Last appointment: 2/29/24  Next appointment: 5/31/24  Previous refill encounter(s): 2/1/24 #30    Requested Prescriptions     Pending Prescriptions Disp Refills    dicyclomine (BENTYL) 10 MG capsule [Pharmacy Med Name: DICYCLOMINE 10 MG CAPSULE] 30 capsule 0     Sig: TAKE 1 CAPSULE BY MOUTH 4 TIMES DAILY AS NEEDED FOR STOMACH CRAMPS         For Pharmacy Admin Tracking Only    Program: Medication Refill  CPA in place:    Recommendation Provided To:   Intervention Detail: New Rx: 1, reason: Patient Preference  Intervention Accepted By:   Gap Closed?:    Time Spent (min): 5

## 2024-04-13 ENCOUNTER — HOSPITAL ENCOUNTER (EMERGENCY)
Facility: HOSPITAL | Age: 59
Discharge: HOME | End: 2024-04-13
Payer: COMMERCIAL

## 2024-04-13 ENCOUNTER — APPOINTMENT (OUTPATIENT)
Facility: HOSPITAL | Age: 59
End: 2024-04-13
Payer: COMMERCIAL

## 2024-04-13 VITALS
TEMPERATURE: 97.9 F | WEIGHT: 239 LBS | BODY MASS INDEX: 36.22 KG/M2 | DIASTOLIC BLOOD PRESSURE: 58 MMHG | OXYGEN SATURATION: 95 % | SYSTOLIC BLOOD PRESSURE: 94 MMHG | RESPIRATION RATE: 18 BRPM | HEIGHT: 68 IN | HEART RATE: 98 BPM

## 2024-04-13 DIAGNOSIS — K57.32 SIGMOID DIVERTICULITIS: Primary | ICD-10-CM

## 2024-04-13 LAB
ALBUMIN SERPL-MCNC: 3.4 G/DL (ref 3.5–5)
ALBUMIN/GLOB SERPL: 0.8 (ref 1.1–2.2)
ALP SERPL-CCNC: 102 U/L (ref 45–117)
ALT SERPL-CCNC: 24 U/L (ref 12–78)
ANION GAP SERPL CALC-SCNC: 6 MMOL/L (ref 5–15)
APPEARANCE UR: ABNORMAL
AST SERPL-CCNC: 12 U/L (ref 15–37)
BACTERIA URNS QL MICRO: NEGATIVE /HPF
BASOPHILS # BLD: 0.1 K/UL (ref 0–0.1)
BASOPHILS NFR BLD: 1 % (ref 0–1)
BILIRUB SERPL-MCNC: 0.5 MG/DL (ref 0.2–1)
BILIRUB UR QL CFM: NEGATIVE
BUN SERPL-MCNC: 17 MG/DL (ref 6–20)
BUN/CREAT SERPL: 16 (ref 12–20)
CALCIUM SERPL-MCNC: 9.1 MG/DL (ref 8.5–10.1)
CHLORIDE SERPL-SCNC: 108 MMOL/L (ref 97–108)
CO2 SERPL-SCNC: 26 MMOL/L (ref 21–32)
COLOR UR: ABNORMAL
CREAT SERPL-MCNC: 1.08 MG/DL (ref 0.55–1.02)
DIFFERENTIAL METHOD BLD: ABNORMAL
EOSINOPHIL # BLD: 0.2 K/UL (ref 0–0.4)
EOSINOPHIL NFR BLD: 2 % (ref 0–7)
EPITH CASTS URNS QL MICRO: ABNORMAL /LPF
ERYTHROCYTE [DISTWIDTH] IN BLOOD BY AUTOMATED COUNT: 13.3 % (ref 11.5–14.5)
GLOBULIN SER CALC-MCNC: 4.3 G/DL (ref 2–4)
GLUCOSE SERPL-MCNC: 128 MG/DL (ref 65–100)
GLUCOSE UR STRIP.AUTO-MCNC: NEGATIVE MG/DL
HCT VFR BLD AUTO: 40.4 % (ref 35–47)
HGB BLD-MCNC: 13.2 G/DL (ref 11.5–16)
HGB UR QL STRIP: NEGATIVE
HYALINE CASTS URNS QL MICRO: ABNORMAL /LPF (ref 0–2)
IMM GRANULOCYTES # BLD AUTO: 0 K/UL (ref 0–0.04)
IMM GRANULOCYTES NFR BLD AUTO: 0 % (ref 0–0.5)
KETONES UR QL STRIP.AUTO: NEGATIVE MG/DL
LEUKOCYTE ESTERASE UR QL STRIP.AUTO: NEGATIVE
LYMPHOCYTES # BLD: 2 K/UL (ref 0.8–3.5)
LYMPHOCYTES NFR BLD: 18 % (ref 12–49)
MCH RBC QN AUTO: 30.5 PG (ref 26–34)
MCHC RBC AUTO-ENTMCNC: 32.7 G/DL (ref 30–36.5)
MCV RBC AUTO: 93.3 FL (ref 80–99)
MONOCYTES # BLD: 0.8 K/UL (ref 0–1)
MONOCYTES NFR BLD: 7 % (ref 5–13)
NEUTS SEG # BLD: 8.1 K/UL (ref 1.8–8)
NEUTS SEG NFR BLD: 72 % (ref 32–75)
NITRITE UR QL STRIP.AUTO: NEGATIVE
NRBC # BLD: 0 K/UL (ref 0–0.01)
NRBC BLD-RTO: 0 PER 100 WBC
PH UR STRIP: 5.5 (ref 5–8)
PLATELET # BLD AUTO: 325 K/UL (ref 150–400)
PMV BLD AUTO: 10.4 FL (ref 8.9–12.9)
POTASSIUM SERPL-SCNC: 3.3 MMOL/L (ref 3.5–5.1)
PROT SERPL-MCNC: 7.7 G/DL (ref 6.4–8.2)
PROT UR STRIP-MCNC: ABNORMAL MG/DL
RBC # BLD AUTO: 4.33 M/UL (ref 3.8–5.2)
RBC #/AREA URNS HPF: ABNORMAL /HPF (ref 0–5)
SODIUM SERPL-SCNC: 140 MMOL/L (ref 136–145)
SP GR UR REFRACTOMETRY: >1.03 (ref 1–1.03)
URINE CULTURE IF INDICATED: ABNORMAL
UROBILINOGEN UR QL STRIP.AUTO: 1 EU/DL (ref 0.2–1)
WBC # BLD AUTO: 11.1 K/UL (ref 3.6–11)
WBC URNS QL MICRO: ABNORMAL /HPF (ref 0–4)

## 2024-04-13 PROCEDURE — 96374 THER/PROPH/DIAG INJ IV PUSH: CPT

## 2024-04-13 PROCEDURE — 93005 ELECTROCARDIOGRAM TRACING: CPT

## 2024-04-13 PROCEDURE — 80053 COMPREHEN METABOLIC PANEL: CPT

## 2024-04-13 PROCEDURE — 6360000002 HC RX W HCPCS

## 2024-04-13 PROCEDURE — 6360000004 HC RX CONTRAST MEDICATION

## 2024-04-13 PROCEDURE — 99285 EMERGENCY DEPT VISIT HI MDM: CPT

## 2024-04-13 PROCEDURE — 85025 COMPLETE CBC W/AUTO DIFF WBC: CPT

## 2024-04-13 PROCEDURE — 74177 CT ABD & PELVIS W/CONTRAST: CPT

## 2024-04-13 PROCEDURE — 36415 COLL VENOUS BLD VENIPUNCTURE: CPT

## 2024-04-13 PROCEDURE — 81001 URINALYSIS AUTO W/SCOPE: CPT

## 2024-04-13 RX ORDER — DICYCLOMINE HCL 20 MG
20 TABLET ORAL 4 TIMES DAILY PRN
Qty: 20 TABLET | Refills: 0 | Status: SHIPPED | OUTPATIENT
Start: 2024-04-13

## 2024-04-13 RX ORDER — KETOROLAC TROMETHAMINE 30 MG/ML
15 INJECTION, SOLUTION INTRAMUSCULAR; INTRAVENOUS
Status: COMPLETED | OUTPATIENT
Start: 2024-04-13 | End: 2024-04-13

## 2024-04-13 RX ORDER — KETOROLAC TROMETHAMINE 10 MG/1
10 TABLET, FILM COATED ORAL EVERY 6 HOURS PRN
Qty: 20 TABLET | Refills: 0 | Status: SHIPPED | OUTPATIENT
Start: 2024-04-13

## 2024-04-13 RX ORDER — AMOXICILLIN AND CLAVULANATE POTASSIUM 875; 125 MG/1; MG/1
1 TABLET, FILM COATED ORAL 2 TIMES DAILY
Qty: 20 TABLET | Refills: 0 | Status: SHIPPED | OUTPATIENT
Start: 2024-04-13 | End: 2024-04-23

## 2024-04-13 RX ADMIN — IOPAMIDOL 100 ML: 755 INJECTION, SOLUTION INTRAVENOUS at 17:34

## 2024-04-13 RX ADMIN — KETOROLAC TROMETHAMINE 15 MG: 30 INJECTION, SOLUTION INTRAMUSCULAR at 16:33

## 2024-04-13 ASSESSMENT — PAIN DESCRIPTION - PAIN TYPE: TYPE: ACUTE PAIN

## 2024-04-13 ASSESSMENT — PAIN SCALES - GENERAL: PAINLEVEL_OUTOF10: 8

## 2024-04-13 ASSESSMENT — PAIN DESCRIPTION - LOCATION: LOCATION: ABDOMEN

## 2024-04-13 ASSESSMENT — PAIN - FUNCTIONAL ASSESSMENT: PAIN_FUNCTIONAL_ASSESSMENT: 0-10

## 2024-04-13 NOTE — ED PROVIDER NOTES
[facility] EMERGENCY DEPT  EMERGENCY DEPARTMENT HISTORY AND PHYSICAL EXAM      Date: 4/13/2024  Patient Name: Isabelle Dimas  MRN: [id number]  YOB: 1965  Date of evaluation: 4/13/2024  Provider: AMY Andres NP   Note Started: 4:26 PM EDT 4/13/24    HISTORY OF PRESENT ILLNESS     Chief Complaint   Patient presents with    Abdominal Pain     Pt presents to Ed with c/o of severe pain when she sits to urinate.  Pt states that it does not hurt to pee but feels pain in her bladder.       History Provided By: Patient    HPI: Isabelle Dimas is a 58 y.o. female with past medical history as listed below, presents ambulatory to the emergency department with complaints of a sharp pain in her lower abdomen when she sits, \"pushes to urinate\", and when she walks; symptoms for several days.  Patient states she had similar pain about 1 month ago, she thought she may have a UTI but was already on antibiotics for diverticulitis so she did not seek evaluation/treatment and her symptoms eventually resolved.  Denies fever/chills, chest pain, difficulty breathing, dysuria, urinary frequency, hematuria, nausea/vomiting, diarrhea. Upon arrival to the ED pt is alert and oriented x 3, well-appearing, and interacting appropriately; no obvious distress noted.      PAST MEDICAL HISTORY   Past Medical History:  Past Medical History:   Diagnosis Date    Asthma     Diabetes (HCC)     GERD (gastroesophageal reflux disease)     Hypertension     Other ill-defined conditions(799.89)     meinere's disease       Past Surgical History:  Past Surgical History:   Procedure Laterality Date    TUBAL LIGATION  1993       Family History:  Family History   Problem Relation Age of Onset    Diabetes Mother     Hypertension Mother     Heart Disease Father     Bipolar Disorder Sister     Other Brother         ARDS    Heart Attack Brother     Anxiety Disorder Daughter     ADHD Son     Schizophrenia Son        Social History:  Social History     Tobacco

## 2024-04-13 NOTE — DISCHARGE INSTRUCTIONS
Thank you!  Thank you for allowing me to care for you in the emergency department. It is my goal to provide you with excellent care.  Please fill out the survey that will come to you by mail or email since we listen to your feedback!     Below you will find a list of your tests from today's visit.  Should you have any questions, please do not hesitate to call the emergency department.    Labs  Recent Results (from the past 12 hour(s))   Urinalysis with Reflex to Culture    Collection Time: 04/13/24  4:06 PM    Specimen: Urine    Urine specimen   Result Value Ref Range    Color, UA DARK YELLOW      Appearance CLOUDY (A) CLEAR      Specific Gravity, UA >1.030 (H) 1.003 - 1.030    pH, Urine 5.5 5.0 - 8.0      Protein, UA TRACE (A) NEG mg/dL    Glucose, UA Negative NEG mg/dL    Ketones, Urine Negative NEG mg/dL    Blood, Urine Negative NEG      Urobilinogen, Urine 1.0 0.2 - 1.0 EU/dL    Nitrite, Urine Negative NEG      Leukocyte Esterase, Urine Negative NEG      Urine Culture if Indicated CULTURE NOT INDICATED BY UA RESULT      WBC, UA 0-4 0 - 4 /hpf    RBC, UA 0-5 0 - 5 /hpf    Epithelial Cells UA MODERATE (A) FEW /lpf    BACTERIA, URINE Negative NEG /hpf    Hyaline Casts, UA 2-5 0 - 2 /lpf   Bilirubin, Confirmatory    Collection Time: 04/13/24  4:06 PM   Result Value Ref Range    Bilirubin Confirmation, UA Negative     EKG 12 Lead    Collection Time: 04/13/24  4:12 PM   Result Value Ref Range    Ventricular Rate 112 BPM    Atrial Rate 112 BPM    P-R Interval 114 ms    QRS Duration 80 ms    Q-T Interval 344 ms    QTc Calculation (Bazett) 469 ms    P Axis 20 degrees    R Axis -6 degrees    T Axis 29 degrees    Diagnosis       Sinus tachycardia  Possible Left atrial enlargement  Inferior infarct , age undetermined  Cannot rule out Anterior infarct (cited on or before 04-FEB-2024)  When compared with ECG of 04-FEB-2024 13:31,  Inferior infarct is now present     CMP    Collection Time: 04/13/24  4:27 PM   Result  Value Ref Range    Sodium 140 136 - 145 mmol/L    Potassium 3.3 (L) 3.5 - 5.1 mmol/L    Chloride 108 97 - 108 mmol/L    CO2 26 21 - 32 mmol/L    Anion Gap 6 5 - 15 mmol/L    Glucose 128 (H) 65 - 100 mg/dL    BUN 17 6 - 20 MG/DL    Creatinine 1.08 (H) 0.55 - 1.02 MG/DL    Bun/Cre Ratio 16 12 - 20      Est, Glom Filt Rate 60 (L) >60 ml/min/1.73m2    Calcium 9.1 8.5 - 10.1 MG/DL    Total Bilirubin 0.5 0.2 - 1.0 MG/DL    ALT 24 12 - 78 U/L    AST 12 (L) 15 - 37 U/L    Alk Phosphatase 102 45 - 117 U/L    Total Protein 7.7 6.4 - 8.2 g/dL    Albumin 3.4 (L) 3.5 - 5.0 g/dL    Globulin 4.3 (H) 2.0 - 4.0 g/dL    Albumin/Globulin Ratio 0.8 (L) 1.1 - 2.2     CBC with Auto Differential    Collection Time: 04/13/24  4:27 PM   Result Value Ref Range    WBC 11.1 (H) 3.6 - 11.0 K/uL    RBC 4.33 3.80 - 5.20 M/uL    Hemoglobin 13.2 11.5 - 16.0 g/dL    Hematocrit 40.4 35.0 - 47.0 %    MCV 93.3 80.0 - 99.0 FL    MCH 30.5 26.0 - 34.0 PG    MCHC 32.7 30.0 - 36.5 g/dL    RDW 13.3 11.5 - 14.5 %    Platelets 325 150 - 400 K/uL    MPV 10.4 8.9 - 12.9 FL    Nucleated RBCs 0.0 0  WBC    nRBC 0.00 0.00 - 0.01 K/uL    Neutrophils % 72 32 - 75 %    Lymphocytes % 18 12 - 49 %    Monocytes % 7 5 - 13 %    Eosinophils % 2 0 - 7 %    Basophils % 1 0 - 1 %    Immature Granulocytes % 0 0.0 - 0.5 %    Neutrophils Absolute 8.1 (H) 1.8 - 8.0 K/UL    Lymphocytes Absolute 2.0 0.8 - 3.5 K/UL    Monocytes Absolute 0.8 0.0 - 1.0 K/UL    Eosinophils Absolute 0.2 0.0 - 0.4 K/UL    Basophils Absolute 0.1 0.0 - 0.1 K/UL    Immature Granulocytes Absolute 0.0 0.00 - 0.04 K/UL    Differential Type AUTOMATED         Radiologic Studies  CT ABDOMEN PELVIS W IV CONTRAST Additional Contrast? None   Final Result   Sigmoid diverticulitis        ------------------------------------------------------------------------------------------------------------  The exam and treatment you received in the Emergency Department were for an urgent problem and are not intended as

## 2024-04-14 LAB
EKG ATRIAL RATE: 112 BPM
EKG DIAGNOSIS: NORMAL
EKG P AXIS: 20 DEGREES
EKG P-R INTERVAL: 114 MS
EKG Q-T INTERVAL: 344 MS
EKG QRS DURATION: 80 MS
EKG QTC CALCULATION (BAZETT): 469 MS
EKG R AXIS: -6 DEGREES
EKG T AXIS: 29 DEGREES
EKG VENTRICULAR RATE: 112 BPM

## 2024-04-15 ENCOUNTER — TELEPHONE (OUTPATIENT)
Age: 59
End: 2024-04-15

## 2024-04-15 NOTE — TELEPHONE ENCOUNTER
Patient would like a call back to discuss test results from 4/13/24 ED visit. Please call [phone].

## 2024-04-24 ENCOUNTER — TELEPHONE (OUTPATIENT)
Age: 59
End: 2024-04-24

## 2024-04-24 NOTE — TELEPHONE ENCOUNTER
Patient was given contact information to  Jose Guadalupe MACIAS to follow up on ED visit on 4/14/24.  Patient has an appointment on 5/31/24 and placed on wait list for a sooner appointment for ED follow up. Patient  can be reached at [phone].

## 2024-06-05 DIAGNOSIS — K58.1 IRRITABLE BOWEL SYNDROME WITH CONSTIPATION: ICD-10-CM

## 2024-06-05 NOTE — TELEPHONE ENCOUNTER
Last appointment: 2/29/24  Next appointment: no show 5/15/24  Previous refill encounter(s): 6/19/23    Requested Prescriptions     Pending Prescriptions Disp Refills    linaclotide (LINZESS) 145 MCG capsule 90 capsule 0     Sig: Take 1 capsule by mouth daily         For Pharmacy Admin Tracking Only    Program: Medication Refill  CPA in place:    Recommendation Provided To:   Intervention Detail: New Rx: 1, reason: Patient Preference  Intervention Accepted By:   Gap Closed?:    Time Spent (min): 5

## 2024-06-25 DIAGNOSIS — K58.1 IRRITABLE BOWEL SYNDROME WITH CONSTIPATION: ICD-10-CM

## 2024-06-26 ENCOUNTER — CLINICAL DOCUMENTATION (OUTPATIENT)
Age: 59
End: 2024-06-26

## 2024-06-26 NOTE — PROGRESS NOTES
PA initiated for Plecanatide 3 mg tablet, denied, Your doctor must show that you have failed at least 2 types of drugs for constipation (a stimulant, an osmotic, or bulk forming laxative, or your must show how these drugs are not right for you. Your doctor must show that you have fail a trial of 2 preferred drugs which are: Lubiprostone, Amitiza, Linzess,or show why these drug are not right for you.

## 2024-07-08 ENCOUNTER — TELEPHONE (OUTPATIENT)
Age: 59
End: 2024-07-08

## 2024-07-08 NOTE — TELEPHONE ENCOUNTER
Explained to patient per Dr. Tyler Plecanatide 3 mg was denied by insurance so Dr. Tyler sent Linzess to pharmacy on file.

## 2024-07-19 DIAGNOSIS — J45.20 MILD INTERMITTENT ASTHMA WITHOUT COMPLICATION: ICD-10-CM

## 2024-07-19 RX ORDER — ALBUTEROL SULFATE 90 UG/1
AEROSOL, METERED RESPIRATORY (INHALATION)
Qty: 18 EACH | Refills: 5 | Status: SHIPPED | OUTPATIENT
Start: 2024-07-19

## 2024-07-19 NOTE — TELEPHONE ENCOUNTER
Last appointment: 2/29/24  Next appointment: no show 5/15/24  Previous refill encounter(s): 6/19/23    Requested Prescriptions     Pending Prescriptions Disp Refills    albuterol sulfate HFA (PROVENTIL;VENTOLIN;PROAIR) 108 (90 Base) MCG/ACT inhaler [Pharmacy Med Name: ALBUTEROL HFA (VENTOLIN) INH] 18 each 5     Sig: INHALE 2 PUFFS BY MOUTH EVERY 4 HOURS AS NEEDED FOR WHEEZE         For Pharmacy Admin Tracking Only    Program: Medication Refill  CPA in place:    Recommendation Provided To:   Intervention Detail: New Rx: 1, reason: Patient Preference  Intervention Accepted By:   Gap Closed?:    Time Spent (min): 5

## 2024-08-09 DIAGNOSIS — A08.4 VIRAL GASTROENTERITIS: ICD-10-CM

## 2024-08-09 DIAGNOSIS — G93.2 BENIGN INTRACRANIAL HYPERTENSION: ICD-10-CM

## 2024-08-09 RX ORDER — BUTALBITAL, ACETAMINOPHEN AND CAFFEINE 50; 325; 40 MG/1; MG/1; MG/1
TABLET ORAL
Qty: 90 TABLET | Refills: 1 | Status: SHIPPED | OUTPATIENT
Start: 2024-08-09

## 2024-08-09 RX ORDER — DICYCLOMINE HYDROCHLORIDE 10 MG/1
CAPSULE ORAL
Qty: 30 CAPSULE | Refills: 0 | OUTPATIENT
Start: 2024-08-09

## 2024-08-09 NOTE — TELEPHONE ENCOUNTER
Last appointment: 2/29/24  Next appointment: no show 5/15/24  Previous refill encounter(s): 3/13/24 Bentyl 10mg #30, 9/19/23 Fioricet #90 with 1 refill    Requested Prescriptions     Pending Prescriptions Disp Refills    dicyclomine (BENTYL) 10 MG capsule [Pharmacy Med Name: DICYCLOMINE 10 MG CAPSULE] 30 capsule 0     Sig: TAKE 1 CAPSULE BY MOUTH 4 TIMES DAILY AS NEEDED FOR STOMACH CRAMPS    butalbital-acetaminophen-caffeine (FIORICET, ESGIC) -40 MG per tablet [Pharmacy Med Name: NIXODJ-EBYLDCBY-GSSP -40] 90 tablet 1     Sig: TAKE 1 TABLET BY MOUTH EVERY 8 HOURS AS NEEDED FOR PAIN         For Pharmacy Admin Tracking Only    Program: Medication Refill  CPA in place:    Recommendation Provided To:   Intervention Detail: New Rx: 2, reason: Patient Preference  Intervention Accepted By:   Gap Closed?:    Time Spent (min): 5

## 2024-08-13 ENCOUNTER — CLINICAL DOCUMENTATION (OUTPATIENT)
Age: 59
End: 2024-08-13

## 2024-09-09 DIAGNOSIS — K58.1 IRRITABLE BOWEL SYNDROME WITH CONSTIPATION: ICD-10-CM

## 2024-10-24 LAB — HBA1C MFR BLD HPLC: 6.1 %
